# Patient Record
Sex: MALE | Race: BLACK OR AFRICAN AMERICAN | NOT HISPANIC OR LATINO | Employment: FULL TIME | URBAN - METROPOLITAN AREA
[De-identification: names, ages, dates, MRNs, and addresses within clinical notes are randomized per-mention and may not be internally consistent; named-entity substitution may affect disease eponyms.]

---

## 2018-05-17 ENCOUNTER — APPOINTMENT (OUTPATIENT)
Dept: GENERAL RADIOLOGY | Facility: HOSPITAL | Age: 53
End: 2018-05-17

## 2018-05-17 ENCOUNTER — HOSPITAL ENCOUNTER (EMERGENCY)
Facility: HOSPITAL | Age: 53
Discharge: HOME | End: 2018-05-17
Attending: EMERGENCY MEDICINE | Admitting: EMERGENCY MEDICINE

## 2018-05-17 VITALS
TEMPERATURE: 97.8 F | HEART RATE: 76 BPM | DIASTOLIC BLOOD PRESSURE: 108 MMHG | RESPIRATION RATE: 18 BRPM | HEIGHT: 72 IN | WEIGHT: 290 LBS | SYSTOLIC BLOOD PRESSURE: 201 MMHG | OXYGEN SATURATION: 96 % | BODY MASS INDEX: 39.28 KG/M2

## 2018-05-17 DIAGNOSIS — R60.9 EDEMA, UNSPECIFIED TYPE: Primary | ICD-10-CM

## 2018-05-17 DIAGNOSIS — R60.0 HAND EDEMA: ICD-10-CM

## 2018-05-17 DIAGNOSIS — R60.0 LEG EDEMA: ICD-10-CM

## 2018-05-17 DIAGNOSIS — I10 HYPERTENSION, UNSPECIFIED TYPE: ICD-10-CM

## 2018-05-17 LAB
ALBUMIN SERPL-MCNC: 4.3 G/DL (ref 3.2–4.8)
ALBUMIN/GLOB SERPL: 1 G/DL (ref 1.5–2.5)
ALP SERPL-CCNC: 98 U/L (ref 25–100)
ALT SERPL W P-5'-P-CCNC: 25 U/L (ref 7–40)
ANION GAP SERPL CALCULATED.3IONS-SCNC: 10 MMOL/L (ref 3–11)
AST SERPL-CCNC: 19 U/L (ref 0–33)
BACTERIA UR QL AUTO: ABNORMAL /HPF
BASOPHILS # BLD AUTO: 0.02 10*3/MM3 (ref 0–0.2)
BASOPHILS NFR BLD AUTO: 0.2 % (ref 0–1)
BILIRUB SERPL-MCNC: 0.8 MG/DL (ref 0.3–1.2)
BILIRUB UR QL STRIP: ABNORMAL
BNP SERPL-MCNC: 87 PG/ML (ref 0–100)
BUN BLD-MCNC: 17 MG/DL (ref 9–23)
BUN/CREAT SERPL: 15.5 (ref 7–25)
CALCIUM SPEC-SCNC: 9.6 MG/DL (ref 8.7–10.4)
CHLORIDE SERPL-SCNC: 101 MMOL/L (ref 99–109)
CK SERPL-CCNC: 121 U/L (ref 26–174)
CLARITY UR: ABNORMAL
CO2 SERPL-SCNC: 28 MMOL/L (ref 20–31)
COLOR UR: ABNORMAL
CREAT BLD-MCNC: 1.1 MG/DL (ref 0.6–1.3)
DEPRECATED RDW RBC AUTO: 41.4 FL (ref 37–54)
EOSINOPHIL # BLD AUTO: 0.18 10*3/MM3 (ref 0–0.3)
EOSINOPHIL NFR BLD AUTO: 1.6 % (ref 0–3)
ERYTHROCYTE [DISTWIDTH] IN BLOOD BY AUTOMATED COUNT: 16.2 % (ref 11.3–14.5)
GFR SERPL CREATININE-BSD FRML MDRD: 85 ML/MIN/1.73
GLOBULIN UR ELPH-MCNC: 4.1 GM/DL
GLUCOSE BLD-MCNC: 93 MG/DL (ref 70–100)
GLUCOSE UR STRIP-MCNC: NEGATIVE MG/DL
HCT VFR BLD AUTO: 42.8 % (ref 38.9–50.9)
HGB BLD-MCNC: 13.9 G/DL (ref 13.1–17.5)
HGB UR QL STRIP.AUTO: NEGATIVE
IMM GRANULOCYTES # BLD: 0.02 10*3/MM3 (ref 0–0.03)
IMM GRANULOCYTES NFR BLD: 0.2 % (ref 0–0.6)
KETONES UR QL STRIP: ABNORMAL
LEUKOCYTE ESTERASE UR QL STRIP.AUTO: ABNORMAL
LYMPHOCYTES # BLD AUTO: 2.11 10*3/MM3 (ref 0.6–4.8)
LYMPHOCYTES NFR BLD AUTO: 18.4 % (ref 24–44)
MCH RBC QN AUTO: 23.1 PG (ref 27–31)
MCHC RBC AUTO-ENTMCNC: 32.5 G/DL (ref 32–36)
MCV RBC AUTO: 71 FL (ref 80–99)
MONOCYTES # BLD AUTO: 0.74 10*3/MM3 (ref 0–1)
MONOCYTES NFR BLD AUTO: 6.5 % (ref 0–12)
NEUTROPHILS # BLD AUTO: 8.41 10*3/MM3 (ref 1.5–8.3)
NEUTROPHILS NFR BLD AUTO: 73.3 % (ref 41–71)
NITRITE UR QL STRIP: NEGATIVE
PH UR STRIP.AUTO: 5.5 [PH] (ref 5–8)
PLAT MORPH BLD: NORMAL
PLATELET # BLD AUTO: 212 10*3/MM3 (ref 150–450)
POTASSIUM BLD-SCNC: 3.9 MMOL/L (ref 3.5–5.5)
PROT SERPL-MCNC: 8.4 G/DL (ref 5.7–8.2)
PROT UR QL STRIP: ABNORMAL
RBC # BLD AUTO: 6.03 10*6/MM3 (ref 4.2–5.76)
RBC # UR: ABNORMAL /HPF
RBC MORPH BLD: NORMAL
REF LAB TEST METHOD: ABNORMAL
SODIUM BLD-SCNC: 139 MMOL/L (ref 132–146)
SP GR UR STRIP: 1.03 (ref 1–1.03)
SQUAMOUS #/AREA URNS HPF: ABNORMAL /HPF
UROBILINOGEN UR QL STRIP: ABNORMAL
WBC MORPH BLD: NORMAL
WBC NRBC COR # BLD: 11.46 10*3/MM3 (ref 3.5–10.8)
WBC UR QL AUTO: ABNORMAL /HPF

## 2018-05-17 PROCEDURE — 81001 URINALYSIS AUTO W/SCOPE: CPT | Performed by: NURSE PRACTITIONER

## 2018-05-17 PROCEDURE — 99283 EMERGENCY DEPT VISIT LOW MDM: CPT

## 2018-05-17 PROCEDURE — 83880 ASSAY OF NATRIURETIC PEPTIDE: CPT | Performed by: NURSE PRACTITIONER

## 2018-05-17 PROCEDURE — 71046 X-RAY EXAM CHEST 2 VIEWS: CPT

## 2018-05-17 PROCEDURE — 82550 ASSAY OF CK (CPK): CPT | Performed by: NURSE PRACTITIONER

## 2018-05-17 PROCEDURE — 85025 COMPLETE CBC W/AUTO DIFF WBC: CPT | Performed by: NURSE PRACTITIONER

## 2018-05-17 PROCEDURE — 80053 COMPREHEN METABOLIC PANEL: CPT | Performed by: NURSE PRACTITIONER

## 2018-05-17 PROCEDURE — 85007 BL SMEAR W/DIFF WBC COUNT: CPT | Performed by: NURSE PRACTITIONER

## 2018-05-17 RX ORDER — LISINOPRIL 10 MG/1
20 TABLET ORAL ONCE
Status: COMPLETED | OUTPATIENT
Start: 2018-05-17 | End: 2018-05-17

## 2018-05-17 RX ORDER — FUROSEMIDE 40 MG/1
40 TABLET ORAL ONCE
Status: COMPLETED | OUTPATIENT
Start: 2018-05-17 | End: 2018-05-17

## 2018-05-17 RX ORDER — FUROSEMIDE 20 MG/1
20 TABLET ORAL DAILY
Qty: 7 TABLET | Refills: 0 | Status: SHIPPED | OUTPATIENT
Start: 2018-05-17 | End: 2018-06-24

## 2018-05-17 RX ORDER — LISINOPRIL AND HYDROCHLOROTHIAZIDE 20; 12.5 MG/1; MG/1
1 TABLET ORAL DAILY
Qty: 30 TABLET | Refills: 1 | Status: SHIPPED | OUTPATIENT
Start: 2018-05-17 | End: 2018-09-17

## 2018-05-17 RX ORDER — SODIUM CHLORIDE 0.9 % (FLUSH) 0.9 %
10 SYRINGE (ML) INJECTION AS NEEDED
Status: DISCONTINUED | OUTPATIENT
Start: 2018-05-17 | End: 2018-05-17 | Stop reason: HOSPADM

## 2018-05-17 RX ORDER — SPIRONOLACTONE 25 MG/1
25 TABLET ORAL DAILY
COMMUNITY
End: 2018-05-17 | Stop reason: ALTCHOICE

## 2018-05-17 RX ADMIN — LISINOPRIL 20 MG: 10 TABLET ORAL at 19:31

## 2018-05-17 RX ADMIN — FUROSEMIDE 40 MG: 40 TABLET ORAL at 19:31

## 2018-05-17 NOTE — ED PROVIDER NOTES
Subjective   Freeman Jean is a 52 y.o.male who presents to the emergency department with complaints of joint pain and swelling, worse in the neck, hands, and lower extremities, that has persisted since onset two weeks ago. He was seen at [facility] last week when the pain and swelling had not improved and had an EKG, chest x-ray, and CT of the head with an apparently normal workup. He was given medication in the emergency department but thinks that it was just for his blood pressure. He does have a history of hypertension but has never been diagnosed with diabetes mellitus or congestive heart failure. He has been taking a diuretic without any relief. He has no urinary symptoms, difficulty breathing, or any other acute complaints.         History provided by:  Patient  Illness   Location:  Neck, hands, and lower extremities  Quality:  Pain and swelling  Severity:  Moderate  Onset quality:  Unable to specify  Duration:  2 weeks  Timing:  Constant  Progression:  Unchanged  Chronicity:  New  Context:  The patient has had pain and swelling in his neck, hands, and lower extremities for two weeks. He had a normal workup one week ago at [facility].   Relieved by:  Nothing  Worsened by:  Nothing  Ineffective treatments:  Fluid pill  Associated symptoms: no shortness of breath        Review of Systems   Respiratory: Negative for shortness of breath.    Genitourinary: Negative for difficulty urinating, dysuria, frequency, hematuria and urgency.   Musculoskeletal: Positive for arthralgias, joint swelling and neck pain.   All other systems reviewed and are negative.      Past Medical History:   Diagnosis Date   • Hypertension        No Known Allergies    History reviewed. No pertinent surgical history.    History reviewed. No pertinent family history.    Social History     Social History   • Marital status: Single     Social History Main Topics   • Smoking status: Never Smoker   • Alcohol use Yes      Comment: RARE   • Drug  use: No   • Sexual activity: Defer     Other Topics Concern   • Not on file         Objective   Physical Exam   Constitutional: He is oriented to person, place, and time. He appears well-developed and well-nourished. No distress.   HENT:   Head: Normocephalic and atraumatic.   Nose: Nose normal.   Airway patent.   Eyes: Conjunctivae are normal. No scleral icterus.   Neck: Normal range of motion and phonation normal. Neck supple.   Pulmonary/Chest: Effort normal. No respiratory distress.   Musculoskeletal: He exhibits edema.   3+ pitting edema in the lower extremities and both hands.    Neurological: He is alert and oriented to person, place, and time.   Skin: Skin is warm and dry.   Psychiatric: He has a normal mood and affect. His behavior is normal.   Nursing note and vitals reviewed.      Procedures         ED Course  ED Course as of May 17 1925   Thu May 17, 2018   1917 No pcp. Will changes pt bp meds. Well aware of the ss of worsen. All thankful and agreeable. Will give pcp fu.  [JM]      ED Course User Index  [YONATAN] JOSE ARMANDO Dumas     Recent Results (from the past 24 hour(s))   Comprehensive Metabolic Panel    Collection Time: 05/17/18  5:09 PM   Result Value Ref Range    Glucose 93 70 - 100 mg/dL    BUN 17 9 - 23 mg/dL    Creatinine 1.10 0.60 - 1.30 mg/dL    Sodium 139 132 - 146 mmol/L    Potassium 3.9 3.5 - 5.5 mmol/L    Chloride 101 99 - 109 mmol/L    CO2 28.0 20.0 - 31.0 mmol/L    Calcium 9.6 8.7 - 10.4 mg/dL    Total Protein 8.4 (H) 5.7 - 8.2 g/dL    Albumin 4.30 3.20 - 4.80 g/dL    ALT (SGPT) 25 7 - 40 U/L    AST (SGOT) 19 0 - 33 U/L    Alkaline Phosphatase 98 25 - 100 U/L    Total Bilirubin 0.8 0.3 - 1.2 mg/dL    eGFR  African Amer 85 >60 mL/min/1.73    Globulin 4.1 gm/dL    A/G Ratio 1.0 (L) 1.5 - 2.5 g/dL    BUN/Creatinine Ratio 15.5 7.0 - 25.0    Anion Gap 10.0 3.0 - 11.0 mmol/L   BNP    Collection Time: 05/17/18  5:09 PM   Result Value Ref Range    BNP 87.0 0.0 - 100.0 pg/mL   CK    Collection  Time: 05/17/18  5:09 PM   Result Value Ref Range    Creatine Kinase 121 26 - 174 U/L   CBC Auto Differential    Collection Time: 05/17/18  5:09 PM   Result Value Ref Range    WBC 11.46 (H) 3.50 - 10.80 10*3/mm3    RBC 6.03 (H) 4.20 - 5.76 10*6/mm3    Hemoglobin 13.9 13.1 - 17.5 g/dL    Hematocrit 42.8 38.9 - 50.9 %    MCV 71.0 (L) 80.0 - 99.0 fL    MCH 23.1 (L) 27.0 - 31.0 pg    MCHC 32.5 32.0 - 36.0 g/dL    RDW 16.2 (H) 11.3 - 14.5 %    RDW-SD 41.4 37.0 - 54.0 fl    Platelets 212 150 - 450 10*3/mm3    Neutrophil % 73.3 (H) 41.0 - 71.0 %    Lymphocyte % 18.4 (L) 24.0 - 44.0 %    Monocyte % 6.5 0.0 - 12.0 %    Eosinophil % 1.6 0.0 - 3.0 %    Basophil % 0.2 0.0 - 1.0 %    Immature Grans % 0.2 0.0 - 0.6 %    Neutrophils, Absolute 8.41 (H) 1.50 - 8.30 10*3/mm3    Lymphocytes, Absolute 2.11 0.60 - 4.80 10*3/mm3    Monocytes, Absolute 0.74 0.00 - 1.00 10*3/mm3    Eosinophils, Absolute 0.18 0.00 - 0.30 10*3/mm3    Basophils, Absolute 0.02 0.00 - 0.20 10*3/mm3    Immature Grans, Absolute 0.02 0.00 - 0.03 10*3/mm3   Scan Slide    Collection Time: 05/17/18  5:09 PM   Result Value Ref Range    RBC Morphology Normal Normal    WBC Morphology Normal Normal    Platelet Morphology Normal Normal   Urinalysis With / Culture If Indicated - Urine, Clean Catch    Collection Time: 05/17/18  5:40 PM   Result Value Ref Range    Color, UA Dark Yellow (A) Yellow, Straw    Appearance, UA Cloudy (A) Clear    pH, UA 5.5 5.0 - 8.0    Specific Gravity, UA 1.034 (H) 1.001 - 1.030    Glucose, UA Negative Negative    Ketones, UA Trace (A) Negative    Bilirubin, UA Small (1+) (A) Negative    Blood, UA Negative Negative    Protein, UA 30 mg/dL (1+) (A) Negative    Leuk Esterase, UA Trace (A) Negative    Nitrite, UA Negative Negative    Urobilinogen, UA 1.0 E.U./dL 0.2 - 1.0 E.U./dL   Urinalysis, Microscopic Only - Urine, Clean Catch    Collection Time: 05/17/18  5:40 PM   Result Value Ref Range    RBC, UA 0-2 None Seen, 0-2 /HPF    WBC, UA 3-5 (A)  "None Seen, 0-2 /HPF    Bacteria, UA None Seen None Seen, Trace /HPF    Squamous Epithelial Cells, UA 3-6 (A) None Seen, 0-2 /HPF    Methodology Manual Light Microscopy      Note: In addition to lab results from this visit, the labs listed above may include labs taken at another facility or during a different encounter within the last 24 hours. Please correlate lab times with ED admission and discharge times for further clarification of the services performed during this visit.    XR Chest 2 View   Preliminary Result   Increase in central bony vascularity with interstitial   prominence concerning for interstitial edema however no focal   consolidation or significant pleural effusion.                Vitals:    05/17/18 1422   BP: (!) 193/92   BP Location: Left arm   Patient Position: Sitting   Pulse: 76   Resp: 18   Temp: 97.8 °F (36.6 °C)   TempSrc: Oral   SpO2: 96%   Weight: 132 kg (290 lb)   Height: 182.9 cm (72\")     Medications   sodium chloride 0.9 % flush 10 mL (not administered)   furosemide (LASIX) tablet 40 mg (not administered)   lisinopril (PRINIVIL,ZESTRIL) tablet 20 mg (not administered)     ECG/EMG Results (last 24 hours)     ** No results found for the last 24 hours. **                       [facility]    Final diagnoses:   Edema, unspecified type   Hypertension, unspecified type   Leg edema   Hand edema       Documentation assistance provided by aquilino Quesada.  Information recorded by the scribe was done at my direction and has been verified and validated by me.     Ana Quesada  05/17/18 3970       JOSE ARMANDO Dumas  05/17/18 1925    "

## 2018-05-17 NOTE — DISCHARGE INSTRUCTIONS
Follow up with one of the [facility] physician groups below to setup primary care. If you have trouble following up, please call Claudia Gaitan, our transitional care nurse, at [phone].    (Dr. Aguero, Dr. Rutledge, Dr. Scott, and Dr. Ragsdale.)  [facility], Primary Care, [phone], [address], [place] [address]    [facility], Primary Care, [phone], [address], [address] [place], [address] [facility]) [facility], Primary Care, [phone], [address], [address] [place], [address] [facility], Primary Care, [phone], [address], [address] [place], [address]     [place] [facility], Primary Care, [phone], [address], [address] [place], [address]    [place] 2 [facility], Primary Care, [phone], [address]    [place], [address] [facility], Primary Care, [phone], [address] [place], [address] [facility], Primary Care, [phone], [address], [address] [place], [address] [facility]) [facility], Primary Care, [phone], [address], [address] [place], [address] [facility] Medical [facility], Primary Care, [phone], [address], [address] [place], [address] [facility], Primary Care, [phone], [address], [place], [address]     [facility], Primary Care, [phone], [address] [address]    Follow up with one of the physician centers below to setup primary care.    [facility], [phone],  [address], [address], [place], [facility]    [facility] Dept[facility], [phone], [address], [address] [place] [address], [phone], [address] [place], [facility];     [facility], [phone], [address] [place] [address]

## 2018-06-24 ENCOUNTER — HOSPITAL ENCOUNTER (EMERGENCY)
Facility: HOSPITAL | Age: 53
Discharge: HOME | End: 2018-06-24
Attending: EMERGENCY MEDICINE | Admitting: EMERGENCY MEDICINE

## 2018-06-24 VITALS
RESPIRATION RATE: 18 BRPM | SYSTOLIC BLOOD PRESSURE: 184 MMHG | TEMPERATURE: 98.5 F | HEART RATE: 68 BPM | DIASTOLIC BLOOD PRESSURE: 106 MMHG | BODY MASS INDEX: 40.79 KG/M2 | HEIGHT: 72 IN | WEIGHT: 301.15 LBS | OXYGEN SATURATION: 96 %

## 2018-06-24 DIAGNOSIS — M65.9 SYNOVITIS OF MULTIPLE SITES: ICD-10-CM

## 2018-06-24 DIAGNOSIS — I10 ELEVATED BLOOD PRESSURE READING WITH DIAGNOSIS OF HYPERTENSION: ICD-10-CM

## 2018-06-24 DIAGNOSIS — Z82.61 FAMILY HISTORY OF RHEUMATOID ARTHRITIS: ICD-10-CM

## 2018-06-24 DIAGNOSIS — M13.0 POLYARTHRITIS OF MULTIPLE SITES: Primary | ICD-10-CM

## 2018-06-24 LAB
ANION GAP SERPL CALCULATED.3IONS-SCNC: 9 MMOL/L (ref 3–11)
BASOPHILS # BLD AUTO: 0.02 10*3/MM3 (ref 0–0.2)
BASOPHILS NFR BLD AUTO: 0.2 % (ref 0–1)
BUN BLD-MCNC: 16 MG/DL (ref 9–23)
BUN/CREAT SERPL: 21.3 (ref 7–25)
CALCIUM SPEC-SCNC: 9.2 MG/DL (ref 8.7–10.4)
CHLORIDE SERPL-SCNC: 105 MMOL/L (ref 99–109)
CO2 SERPL-SCNC: 28 MMOL/L (ref 20–31)
CREAT BLD-MCNC: 0.75 MG/DL (ref 0.6–1.3)
CRP SERPL-MCNC: 1.56 MG/DL (ref 0–1)
DEPRECATED RDW RBC AUTO: 42.3 FL (ref 37–54)
EOSINOPHIL # BLD AUTO: 0.33 10*3/MM3 (ref 0–0.3)
EOSINOPHIL NFR BLD AUTO: 3.9 % (ref 0–3)
ERYTHROCYTE [DISTWIDTH] IN BLOOD BY AUTOMATED COUNT: 16.9 % (ref 11.3–14.5)
ERYTHROCYTE [SEDIMENTATION RATE] IN BLOOD: 65 MM/HR (ref 0–20)
GFR SERPL CREATININE-BSD FRML MDRD: 132 ML/MIN/1.73
GLUCOSE BLD-MCNC: 100 MG/DL (ref 70–100)
HCT VFR BLD AUTO: 39.7 % (ref 38.9–50.9)
HGB BLD-MCNC: 12.7 G/DL (ref 13.1–17.5)
IMM GRANULOCYTES # BLD: 0.01 10*3/MM3 (ref 0–0.03)
IMM GRANULOCYTES NFR BLD: 0.1 % (ref 0–0.6)
LYMPHOCYTES # BLD AUTO: 1.64 10*3/MM3 (ref 0.6–4.8)
LYMPHOCYTES NFR BLD AUTO: 19.5 % (ref 24–44)
MCH RBC QN AUTO: 22.6 PG (ref 27–31)
MCHC RBC AUTO-ENTMCNC: 32 G/DL (ref 32–36)
MCV RBC AUTO: 70.5 FL (ref 80–99)
MONOCYTES # BLD AUTO: 0.4 10*3/MM3 (ref 0–1)
MONOCYTES NFR BLD AUTO: 4.8 % (ref 0–12)
NEUTROPHILS # BLD AUTO: 6.03 10*3/MM3 (ref 1.5–8.3)
NEUTROPHILS NFR BLD AUTO: 71.6 % (ref 41–71)
PLAT MORPH BLD: NORMAL
PLATELET # BLD AUTO: 153 10*3/MM3 (ref 150–450)
PMV BLD AUTO: ABNORMAL FL (ref 6–12)
POTASSIUM BLD-SCNC: 3.6 MMOL/L (ref 3.5–5.5)
RBC # BLD AUTO: 5.63 10*6/MM3 (ref 4.2–5.76)
RBC MORPH BLD: NORMAL
SODIUM BLD-SCNC: 142 MMOL/L (ref 132–146)
WBC MORPH BLD: NORMAL
WBC NRBC COR # BLD: 8.42 10*3/MM3 (ref 3.5–10.8)

## 2018-06-24 PROCEDURE — 85025 COMPLETE CBC W/AUTO DIFF WBC: CPT | Performed by: EMERGENCY MEDICINE

## 2018-06-24 PROCEDURE — 80048 BASIC METABOLIC PNL TOTAL CA: CPT | Performed by: EMERGENCY MEDICINE

## 2018-06-24 PROCEDURE — 25010000002 METHYLPREDNISOLONE PER 125 MG: Performed by: EMERGENCY MEDICINE

## 2018-06-24 PROCEDURE — 85652 RBC SED RATE AUTOMATED: CPT | Performed by: EMERGENCY MEDICINE

## 2018-06-24 PROCEDURE — 25010000002 KETOROLAC TROMETHAMINE PER 15 MG: Performed by: EMERGENCY MEDICINE

## 2018-06-24 PROCEDURE — 99283 EMERGENCY DEPT VISIT LOW MDM: CPT

## 2018-06-24 PROCEDURE — 96375 TX/PRO/DX INJ NEW DRUG ADDON: CPT

## 2018-06-24 PROCEDURE — 86140 C-REACTIVE PROTEIN: CPT | Performed by: EMERGENCY MEDICINE

## 2018-06-24 PROCEDURE — 96374 THER/PROPH/DIAG INJ IV PUSH: CPT

## 2018-06-24 PROCEDURE — 85007 BL SMEAR W/DIFF WBC COUNT: CPT | Performed by: EMERGENCY MEDICINE

## 2018-06-24 RX ORDER — METHYLPREDNISOLONE SODIUM SUCCINATE 125 MG/2ML
125 INJECTION, POWDER, LYOPHILIZED, FOR SOLUTION INTRAMUSCULAR; INTRAVENOUS ONCE
Status: COMPLETED | OUTPATIENT
Start: 2018-06-24 | End: 2018-06-24

## 2018-06-24 RX ORDER — SODIUM CHLORIDE 0.9 % (FLUSH) 0.9 %
10 SYRINGE (ML) INJECTION AS NEEDED
Status: DISCONTINUED | OUTPATIENT
Start: 2018-06-24 | End: 2018-06-24 | Stop reason: HOSPADM

## 2018-06-24 RX ORDER — KETOROLAC TROMETHAMINE 15 MG/ML
10 INJECTION, SOLUTION INTRAMUSCULAR; INTRAVENOUS ONCE
Status: COMPLETED | OUTPATIENT
Start: 2018-06-24 | End: 2018-06-24

## 2018-06-24 RX ORDER — SULFASALAZINE 500 MG/1
500 TABLET ORAL 2 TIMES DAILY
Qty: 60 TABLET | Refills: 0 | Status: SHIPPED | OUTPATIENT
Start: 2018-06-24 | End: 2018-09-17

## 2018-06-24 RX ORDER — PREDNISONE 10 MG/1
TABLET ORAL
Qty: 35 TABLET | Refills: 0 | Status: SHIPPED | OUTPATIENT
Start: 2018-06-24 | End: 2018-09-17

## 2018-06-24 RX ADMIN — KETOROLAC TROMETHAMINE 10 MG: 15 INJECTION, SOLUTION INTRAMUSCULAR; INTRAVENOUS at 13:17

## 2018-06-24 RX ADMIN — METHYLPREDNISOLONE SODIUM SUCCINATE 125 MG: 125 INJECTION, POWDER, FOR SOLUTION INTRAMUSCULAR; INTRAVENOUS at 13:17

## 2018-09-17 ENCOUNTER — HOSPITAL ENCOUNTER (EMERGENCY)
Facility: HOSPITAL | Age: 53
Discharge: HOME | End: 2018-09-17
Attending: EMERGENCY MEDICINE | Admitting: EMERGENCY MEDICINE

## 2018-09-17 VITALS
DIASTOLIC BLOOD PRESSURE: 70 MMHG | TEMPERATURE: 99.2 F | SYSTOLIC BLOOD PRESSURE: 160 MMHG | WEIGHT: 300 LBS | BODY MASS INDEX: 40.63 KG/M2 | OXYGEN SATURATION: 98 % | HEART RATE: 80 BPM | HEIGHT: 72 IN | RESPIRATION RATE: 20 BRPM

## 2018-09-17 DIAGNOSIS — M13.0 POLYARTHRITIS: Primary | ICD-10-CM

## 2018-09-17 DIAGNOSIS — I10 HYPERTENSION, UNSPECIFIED TYPE: ICD-10-CM

## 2018-09-17 LAB
ALBUMIN SERPL-MCNC: 3.98 G/DL (ref 3.2–4.8)
ALBUMIN/GLOB SERPL: 1.2 G/DL (ref 1.5–2.5)
ALP SERPL-CCNC: 66 U/L (ref 25–100)
ALT SERPL W P-5'-P-CCNC: 13 U/L (ref 7–40)
ANION GAP SERPL CALCULATED.3IONS-SCNC: 8 MMOL/L (ref 3–11)
AST SERPL-CCNC: 16 U/L (ref 0–33)
BASOPHILS # BLD AUTO: 0.02 10*3/MM3 (ref 0–0.2)
BASOPHILS NFR BLD AUTO: 0.2 % (ref 0–1)
BILIRUB SERPL-MCNC: 0.5 MG/DL (ref 0.3–1.2)
BUN BLD-MCNC: 19 MG/DL (ref 9–23)
BUN/CREAT SERPL: 20.7 (ref 7–25)
CALCIUM SPEC-SCNC: 8.9 MG/DL (ref 8.7–10.4)
CHLORIDE SERPL-SCNC: 104 MMOL/L (ref 99–109)
CO2 SERPL-SCNC: 27 MMOL/L (ref 20–31)
CREAT BLD-MCNC: 0.92 MG/DL (ref 0.6–1.3)
CRP SERPL-MCNC: 1.34 MG/DL (ref 0–1)
DEPRECATED RDW RBC AUTO: 41.5 FL (ref 37–54)
EOSINOPHIL # BLD AUTO: 0.22 10*3/MM3 (ref 0–0.3)
EOSINOPHIL NFR BLD AUTO: 2.4 % (ref 0–3)
ERYTHROCYTE [DISTWIDTH] IN BLOOD BY AUTOMATED COUNT: 15.6 % (ref 11.3–14.5)
ERYTHROCYTE [SEDIMENTATION RATE] IN BLOOD: 64 MM/HR (ref 0–20)
GFR SERPL CREATININE-BSD FRML MDRD: 104 ML/MIN/1.73
GLOBULIN UR ELPH-MCNC: 3.2 GM/DL
GLUCOSE BLD-MCNC: 82 MG/DL (ref 70–100)
HCT VFR BLD AUTO: 39.4 % (ref 38.9–50.9)
HGB BLD-MCNC: 12.5 G/DL (ref 13.1–17.5)
HYPOCHROMIA BLD QL: NORMAL
IMM GRANULOCYTES # BLD: 0.02 10*3/MM3 (ref 0–0.03)
IMM GRANULOCYTES NFR BLD: 0.2 % (ref 0–0.6)
LYMPHOCYTES # BLD AUTO: 1.67 10*3/MM3 (ref 0.6–4.8)
LYMPHOCYTES NFR BLD AUTO: 18.4 % (ref 24–44)
MCH RBC QN AUTO: 23.4 PG (ref 27–31)
MCHC RBC AUTO-ENTMCNC: 31.7 G/DL (ref 32–36)
MCV RBC AUTO: 73.6 FL (ref 80–99)
MONOCYTES # BLD AUTO: 0.72 10*3/MM3 (ref 0–1)
MONOCYTES NFR BLD AUTO: 7.9 % (ref 0–12)
NEUTROPHILS # BLD AUTO: 6.46 10*3/MM3 (ref 1.5–8.3)
NEUTROPHILS NFR BLD AUTO: 71.1 % (ref 41–71)
PLAT MORPH BLD: NORMAL
PLATELET # BLD AUTO: 173 10*3/MM3 (ref 150–450)
PMV BLD AUTO: 11.2 FL (ref 6–12)
POTASSIUM BLD-SCNC: 3.9 MMOL/L (ref 3.5–5.5)
PROT SERPL-MCNC: 7.2 G/DL (ref 5.7–8.2)
RBC # BLD AUTO: 5.35 10*6/MM3 (ref 4.2–5.76)
SODIUM BLD-SCNC: 139 MMOL/L (ref 132–146)
WBC MORPH BLD: NORMAL
WBC NRBC COR # BLD: 9.09 10*3/MM3 (ref 3.5–10.8)

## 2018-09-17 PROCEDURE — 85025 COMPLETE CBC W/AUTO DIFF WBC: CPT | Performed by: EMERGENCY MEDICINE

## 2018-09-17 PROCEDURE — 85652 RBC SED RATE AUTOMATED: CPT | Performed by: EMERGENCY MEDICINE

## 2018-09-17 PROCEDURE — 86140 C-REACTIVE PROTEIN: CPT | Performed by: EMERGENCY MEDICINE

## 2018-09-17 PROCEDURE — 85007 BL SMEAR W/DIFF WBC COUNT: CPT | Performed by: EMERGENCY MEDICINE

## 2018-09-17 PROCEDURE — 63710000001 PREDNISONE PER 1 MG: Performed by: EMERGENCY MEDICINE

## 2018-09-17 PROCEDURE — 80053 COMPREHEN METABOLIC PANEL: CPT | Performed by: EMERGENCY MEDICINE

## 2018-09-17 PROCEDURE — 99283 EMERGENCY DEPT VISIT LOW MDM: CPT

## 2018-09-17 RX ORDER — PREDNISONE 10 MG/1
TABLET ORAL
Qty: 35 TABLET | Refills: 0 | Status: SHIPPED | OUTPATIENT
Start: 2018-09-17

## 2018-09-17 RX ORDER — HYDROCODONE BITARTRATE AND ACETAMINOPHEN 7.5; 325 MG/1; MG/1
1 TABLET ORAL ONCE
Status: COMPLETED | OUTPATIENT
Start: 2018-09-17 | End: 2018-09-17

## 2018-09-17 RX ORDER — PREDNISONE 20 MG/1
60 TABLET ORAL ONCE
Status: COMPLETED | OUTPATIENT
Start: 2018-09-17 | End: 2018-09-17

## 2018-09-17 RX ORDER — LISINOPRIL AND HYDROCHLOROTHIAZIDE 20; 12.5 MG/1; MG/1
1 TABLET ORAL DAILY
Qty: 30 TABLET | Refills: 0 | Status: SHIPPED | OUTPATIENT
Start: 2018-09-17 | End: 2018-10-17

## 2018-09-17 RX ORDER — SULFASALAZINE 500 MG/1
500 TABLET ORAL 2 TIMES DAILY
Qty: 60 TABLET | Refills: 0 | OUTPATIENT
Start: 2018-09-17 | End: 2021-05-25

## 2018-09-17 RX ADMIN — PREDNISONE 60 MG: 20 TABLET ORAL at 15:38

## 2018-09-17 RX ADMIN — HYDROCODONE BITARTRATE AND ACETAMINOPHEN 1 TABLET: 7.5; 325 TABLET ORAL at 15:39

## 2018-09-17 NOTE — ED PROVIDER NOTES
"Subjective   Pt is a 54 yo male presenting to ED with joint pain and swelling. He complains of diffuse swelling and generalized pain \"to all my joints\". He explains this is a recurrent flare of previous joint pain and this episode has been going on for about a month. He was last seen in ED in June and given course of Prednisone and Sulfasalzine which significant helped all his joint pain. He states he has not f/u with Rheumatologist or PCP / established care. He states he tried contacting the Rheumatologist but they told him he needed a referral. He denies fever, chills, redness to joint, dizziness, CP, SOB, abd pain or rashes. He has hx of high BP but hasn't been taking Lisinopril for several months. Pt notes multiple family members with Rheumatoid Arthritis.         History provided by:  Patient      Review of Systems   Constitutional: Negative for chills and fever.   HENT: Negative for congestion, ear pain, sore throat and trouble swallowing.    Eyes: Negative for pain, redness and visual disturbance.   Respiratory: Negative for cough and shortness of breath.    Cardiovascular: Negative for chest pain and leg swelling.   Gastrointestinal: Negative for abdominal pain, blood in stool, constipation, diarrhea, nausea and vomiting.   Genitourinary: Negative for difficulty urinating, dysuria and flank pain.   Musculoskeletal: Positive for arthralgias (Diffuse, all joints) and joint swelling. Negative for back pain and neck pain.   Skin: Negative.  Negative for rash and wound.   Allergic/Immunologic: Negative.    Neurological: Negative for dizziness, syncope, weakness, numbness and headaches.   Psychiatric/Behavioral: Negative for confusion.   All other systems reviewed and are negative.      Past Medical History:   Diagnosis Date   • Arthritis    • Hypertension        No Known Allergies    No past surgical history on file.    No family history on file.    Social History     Social History   • Marital status: Single "     Social History Main Topics   • Smoking status: Never Smoker   • Alcohol use Yes      Comment: RARE   • Drug use: No   • Sexual activity: Defer     Other Topics Concern   • Not on file           Objective   Physical Exam   Constitutional: He is oriented to person, place, and time. He appears well-developed and well-nourished.   HENT:   Head: Atraumatic.   Nose: Nose normal.   Eyes: Pupils are equal, round, and reactive to light. Conjunctivae, EOM and lids are normal.   Neck: Normal range of motion. Neck supple.   Cardiovascular: Normal rate, regular rhythm and normal heart sounds.    Pulmonary/Chest: Effort normal and breath sounds normal.   Abdominal: Soft. He exhibits no distension. There is no tenderness. There is no rebound and no guarding.   Musculoskeletal: Normal range of motion. He exhibits edema and tenderness. He exhibits no deformity.   Patient has overall diffuse swelling of digits, hands, feet / ankles, knees with some decreased range of motion. Pain with range of motion.  No cellulitis, erythema, or signs of infection with no gross bony deformities. Neurovascularly intact.    Neurological: He is alert and oriented to person, place, and time. He has normal strength. No sensory deficit.   Skin: Skin is warm, dry and intact. No ecchymosis, no lesion and no rash noted.   Psychiatric: He has a normal mood and affect. His behavior is normal.   Nursing note and vitals reviewed.      Procedures           ED Course      Reviewed old records. Discussed results and tx plan with patient. Will refill Rx for Lisinopril and place on course of Prednisone taper and Sulfasalazine. Explained importance of establishing a PCP ASAP. List provided. Explained he needs referral from PCP for Rheumatologist.       Recent Results (from the past 24 hour(s))   Comprehensive Metabolic Panel    Collection Time: 09/17/18  3:56 PM   Result Value Ref Range    Glucose 82 70 - 100 mg/dL    BUN 19 9 - 23 mg/dL    Creatinine 0.92 0.60 -  1.30 mg/dL    Sodium 139 132 - 146 mmol/L    Potassium 3.9 3.5 - 5.5 mmol/L    Chloride 104 99 - 109 mmol/L    CO2 27.0 20.0 - 31.0 mmol/L    Calcium 8.9 8.7 - 10.4 mg/dL    Total Protein 7.2 5.7 - 8.2 g/dL    Albumin 3.98 3.20 - 4.80 g/dL    ALT (SGPT) 13 7 - 40 U/L    AST (SGOT) 16 0 - 33 U/L    Alkaline Phosphatase 66 25 - 100 U/L    Total Bilirubin 0.5 0.3 - 1.2 mg/dL    eGFR  African Amer 104 >60 mL/min/1.73    Globulin 3.2 gm/dL    A/G Ratio 1.2 (L) 1.5 - 2.5 g/dL    BUN/Creatinine Ratio 20.7 7.0 - 25.0    Anion Gap 8.0 3.0 - 11.0 mmol/L   Sedimentation Rate    Collection Time: 09/17/18  3:56 PM   Result Value Ref Range    Sed Rate 64 (H) 0 - 20 mm/hr   C-reactive Protein    Collection Time: 09/17/18  3:56 PM   Result Value Ref Range    C-Reactive Protein 1.34 (H) 0.00 - 1.00 mg/dL   CBC Auto Differential    Collection Time: 09/17/18  3:56 PM   Result Value Ref Range    WBC 9.09 3.50 - 10.80 10*3/mm3    RBC 5.35 4.20 - 5.76 10*6/mm3    Hemoglobin 12.5 (L) 13.1 - 17.5 g/dL    Hematocrit 39.4 38.9 - 50.9 %    MCV 73.6 (L) 80.0 - 99.0 fL    MCH 23.4 (L) 27.0 - 31.0 pg    MCHC 31.7 (L) 32.0 - 36.0 g/dL    RDW 15.6 (H) 11.3 - 14.5 %    RDW-SD 41.5 37.0 - 54.0 fl    MPV 11.2 6.0 - 12.0 fL    Platelets 173 150 - 450 10*3/mm3    Neutrophil % 71.1 (H) 41.0 - 71.0 %    Lymphocyte % 18.4 (L) 24.0 - 44.0 %    Monocyte % 7.9 0.0 - 12.0 %    Eosinophil % 2.4 0.0 - 3.0 %    Basophil % 0.2 0.0 - 1.0 %    Immature Grans % 0.2 0.0 - 0.6 %    Neutrophils, Absolute 6.46 1.50 - 8.30 10*3/mm3    Lymphocytes, Absolute 1.67 0.60 - 4.80 10*3/mm3    Monocytes, Absolute 0.72 0.00 - 1.00 10*3/mm3    Eosinophils, Absolute 0.22 0.00 - 0.30 10*3/mm3    Basophils, Absolute 0.02 0.00 - 0.20 10*3/mm3    Immature Grans, Absolute 0.02 0.00 - 0.03 10*3/mm3   Scan Slide    Collection Time: 09/17/18  3:56 PM   Result Value Ref Range    Hypochromia Slight/1+ None Seen    WBC Morphology Normal Normal    Platelet Morphology Normal Normal     Note: In  "addition to lab results from this visit, the labs listed above may include labs taken at another facility or during a different encounter within the last 24 hours. Please correlate lab times with ED admission and discharge times for further clarification of the services performed during this visit.    No orders to display     Vitals:    09/17/18 1402 09/17/18 1725 09/17/18 1728   BP: (!) 209/109 172/70 160/70   BP Location: Left arm     Patient Position: Sitting     Pulse: 82 90 80   Resp: 20 18 20   Temp: 99.2 °F (37.3 °C)     TempSrc: Oral     SpO2: 98% 99% 98%   Weight: 136 kg (300 lb)     Height: 182.9 cm (72\")       Medications   predniSONE (DELTASONE) tablet 60 mg (60 mg Oral Given 9/17/18 1538)   HYDROcodone-acetaminophen (NORCO) 7.5-325 MG per tablet 1 tablet (1 tablet Oral Given 9/17/18 1539)     ECG/EMG Results (last 24 hours)     ** No results found for the last 24 hours. **                    [facility]      Final diagnoses:   Polyarthritis   Hypertension, unspecified type            Ivette Clarke PA  09/17/18 2224    "

## 2018-09-17 NOTE — DISCHARGE INSTRUCTIONS
Follow up with one of the [facility] physician groups below to setup primary care. If you have trouble following up, please call Claudia Gaitan, our transitional care nurse, at [phone].    (Dr. Aguero, Dr. Rutledge, Dr. Scott, and Dr. Ragsdale.)  [facility], Primary Care, [phone], [address], [place] [address]    [facility], Primary Care, [phone], [address], [address] [place], [address] [facility]) [facility] Medical [facility], Primary Care, [phone], [address] [place], [address] [facility], Primary Care, [phone], [address], [address] [place], [address]     [place] 1 [facility], Primary Care, [phone], [address] [place], [address]    [place] 2 [facility], Primary Care, [phone], [address], [address]    [place], [address] [facility], Primary Care, [phone], [address], [address] [place], [address] [facility], Primary Care, [phone], [address] [place], [address] [facility]) [facility], Primary Care, [phone], [address] [place], [address] [facility], Primary Care, [phone], [address], [address] [place], [address] [facility], Primary Care, [phone], [address], [place], [address]     [facility], Primary Care, [phone], [address] [id number]

## 2021-05-25 ENCOUNTER — HOSPITAL ENCOUNTER (EMERGENCY)
Facility: HOSPITAL | Age: 56
Discharge: HOME | End: 2021-05-25
Attending: EMERGENCY MEDICINE | Admitting: EMERGENCY MEDICINE

## 2021-05-25 VITALS
WEIGHT: 315 LBS | HEIGHT: 72 IN | TEMPERATURE: 97.8 F | OXYGEN SATURATION: 96 % | SYSTOLIC BLOOD PRESSURE: 181 MMHG | RESPIRATION RATE: 20 BRPM | DIASTOLIC BLOOD PRESSURE: 104 MMHG | BODY MASS INDEX: 42.66 KG/M2 | HEART RATE: 81 BPM

## 2021-05-25 DIAGNOSIS — I10 ELEVATED BLOOD PRESSURE READING WITH DIAGNOSIS OF HYPERTENSION: ICD-10-CM

## 2021-05-25 DIAGNOSIS — R04.0 ACUTE ANTERIOR EPISTAXIS: Primary | ICD-10-CM

## 2021-05-25 PROCEDURE — 99283 EMERGENCY DEPT VISIT LOW MDM: CPT

## 2021-05-25 RX ORDER — CLONIDINE HYDROCHLORIDE 0.1 MG/1
0.1 TABLET ORAL ONCE
Status: COMPLETED | OUTPATIENT
Start: 2021-05-25 | End: 2021-05-25

## 2021-05-25 RX ORDER — OXYMETAZOLINE HYDROCHLORIDE 0.05 G/100ML
2 SPRAY NASAL ONCE
Status: COMPLETED | OUTPATIENT
Start: 2021-05-25 | End: 2021-05-25

## 2021-05-25 RX ORDER — FUROSEMIDE 40 MG/1
40 TABLET ORAL 2 TIMES DAILY
Qty: 60 TABLET | Refills: 0 | Status: SHIPPED | OUTPATIENT
Start: 2021-05-25 | End: 2022-09-21 | Stop reason: SDUPTHER

## 2021-05-25 RX ORDER — PHENYTOIN 50 MG/1
50 TABLET, CHEWABLE ORAL 3 TIMES DAILY
COMMUNITY

## 2021-05-25 RX ORDER — OXYMETAZOLINE HYDROCHLORIDE 0.05 G/100ML
2 SPRAY NASAL 2 TIMES DAILY
Qty: 15 ML | Refills: 0 | Status: SHIPPED | OUTPATIENT
Start: 2021-05-25 | End: 2021-05-28

## 2021-05-25 RX ORDER — FLUTICASONE PROPIONATE 50 MCG
2 SPRAY, SUSPENSION (ML) NASAL DAILY
Qty: 16 G | Refills: 0 | Status: SHIPPED | OUTPATIENT
Start: 2021-05-25

## 2021-05-25 RX ORDER — FUROSEMIDE 40 MG/1
40 TABLET ORAL ONCE
Status: COMPLETED | OUTPATIENT
Start: 2021-05-25 | End: 2021-05-25

## 2021-05-25 RX ORDER — FUROSEMIDE 40 MG/1
40 TABLET ORAL 2 TIMES DAILY
COMMUNITY
End: 2021-05-25 | Stop reason: SDUPTHER

## 2021-05-25 RX ORDER — FEXOFENADINE HCL 180 MG/1
180 TABLET ORAL DAILY
Qty: 30 TABLET | Refills: 0 | Status: SHIPPED | OUTPATIENT
Start: 2021-05-25

## 2021-05-25 RX ADMIN — FUROSEMIDE 40 MG: 40 TABLET ORAL at 10:02

## 2021-05-25 RX ADMIN — CLONIDINE HYDROCHLORIDE 0.1 MG: 0.1 TABLET ORAL at 10:02

## 2021-05-25 RX ADMIN — Medication 2 SPRAY: at 09:40

## 2021-05-25 NOTE — DISCHARGE INSTRUCTIONS
Nasal clip and Afrin as needed for occasional nosebleeds.  Follow-up with ENT if they continue.  You can expect to have occasional nosebleeds moving forward until that area of the nose recovers and heals.

## 2021-05-25 NOTE — ED PROVIDER NOTES
Subjective   The patient presents to the emergency department with a 4-day history of intermittent epistaxis from the right side of the nose.  Patient reports that each episode lasts about 5 minutes.  He does not have any history of a bleeding disorder and is not on any anticoagulation.  The patient does have a history of hypertension.  Patient denies any headache or neck pain.  No fever or chills.  No other acute symptoms or complaints reported on arrival.      History provided by:  Patient      Review of Systems   Constitutional: Negative.    HENT: Positive for nosebleeds.         Epistaxis   Respiratory: Negative.    Cardiovascular: Negative.    Skin: Negative.    Neurological: Negative.    Hematological: Does not bruise/bleed easily.   All other systems reviewed and are negative.      Past Medical History:   Diagnosis Date   • Arthritis    • Hypertension        No Known Allergies    History reviewed. No pertinent surgical history.    History reviewed. No pertinent family history.    Social History     Socioeconomic History   • Marital status: Single     Spouse name: Not on file   • Number of children: Not on file   • Years of education: Not on file   • Highest education level: Not on file   Tobacco Use   • Smoking status: Never Smoker   Substance and Sexual Activity   • Alcohol use: Yes     Comment: RARE   • Drug use: No   • Sexual activity: Defer           Objective   Physical Exam  Vitals and nursing note reviewed.   Constitutional:       Appearance: Normal appearance. He is obese.   HENT:      Head: Normocephalic and atraumatic.      Right Ear: External ear normal.      Left Ear: External ear normal.      Nose: No nasal deformity or nasal tenderness.      Right Nostril: Epistaxis (Dried clot on the nasal septum on the right.  No active bleeding appreciated this time.) present. No septal hematoma.      Left Nostril: No epistaxis.      Mouth/Throat:      Mouth: Mucous membranes are moist.   Eyes:       Extraocular Movements: Extraocular movements intact.      Pupils: Pupils are equal, round, and reactive to light.   Cardiovascular:      Rate and Rhythm: Normal rate and regular rhythm.      Pulses: Normal pulses.   Pulmonary:      Effort: Pulmonary effort is normal. No respiratory distress.      Breath sounds: Normal breath sounds.   Musculoskeletal:      Right lower leg: Edema present.      Left lower leg: Edema present.      Comments: 2+ BLE edema.   Skin:     General: Skin is warm and dry.   Neurological:      General: No focal deficit present.      Mental Status: He is alert and oriented to person, place, and time.   Psychiatric:         Mood and Affect: Mood normal.         Behavior: Behavior normal.         Procedures           ED Course  ED Course as of May 25 0959   Tue May 25, 2021   0942 The patient has intermittent epistaxis of an anterior origin on the right.  We will discharge the patient with symptomatic management and follow-up with his primary care physician for ongoing management. I had a discussion with the patient/family regarding diagnosis, diagnostic results, treatment plan, and medications.  The patient/family indicated understanding of these instructions.  I spent adequate time at the bedside proceeding discharge necessary to personally discuss the aftercare instructions, giving patient education, providing explanations of the results of our evaluations/findings, and my decision making to assure that the patient/family understand the plan of care.  Time was allotted to answer questions at that time and throughout the ED course.  Emphasis was placed on timely follow-up after discharge.  I also discussed the potential for the development of an acute emergent condition requiring further evaluation, admission, or even surgical intervention. I discussed that we found nothing during the visit today indicating the need for further workup, admission, or the presence of an unstable medical condition.   I encouraged the patient to return to the emergency department immediately for ANY concerns, worsening, new complaints, or if symptoms persist and unable to seek follow-up in a timely fashion.  The patient/family expressed understanding and agreement with this plan.     [RS]   7744 I did stress with the patient prior to discharge the importance of taking his blood pressure medications as prescribed.  Patient is on Lasix and takes it intermittently though he did get a new prescription filled yesterday.  Patient states he takes another medication but states he is unable to read the bottle secondary to it being so fading.  Are giving him a dose of medication here but he needs to follow-up with his doctor soon as possible and take those medications as prescribed.  He voiced understanding and was agreeable.    [RS]      ED Course User Index  [RS] Mono Sanchez MD                                           [facility]    Final diagnoses:   Acute anterior epistaxis   Elevated blood pressure reading with diagnosis of hypertension       ED Disposition  ED Disposition     ED Disposition Condition Comment    Discharge Stable           [facility] Emergency Department  [address] [id number] [phone]    As needed, If symptoms worsen or ANY concerns.    Bentley Hannah MD  [address] [place] [address]  [phone]               Medication List      New Prescriptions    fexofenadine 180 MG tablet  Commonly known as: Allegra Allergy  Take 1 tablet by mouth Daily.     fluticasone 50 MCG/ACT nasal spray  Commonly known as: FLONASE  2 sprays into the nostril(s) as directed by provider Daily.     oxymetazoline 0.05 % nasal spray  Commonly known as: AFRIN  2 sprays into the nostril(s) as directed by provider 2 (Two) Times a Day for 3 days.        Changed    furosemide 40 MG tablet  Commonly known as: LASIX  Take 1 tablet by mouth 2 (Two) Times a Day.  What changed:  when to take this        Stop    sulfaSALAzine 500 MG tablet  Commonly known as: Azulfidine           Where to Get Your Medications      You can get these medications from any pharmacy    Bring a paper prescription for each of these medications  · fexofenadine 180 MG tablet  · fluticasone 50 MCG/ACT nasal spray  · furosemide 40 MG tablet  · oxymetazoline 0.05 % nasal spray          Mono Sanchez MD  05/25/21 0510       Mono Sanchez MD  05/25/21 0591

## 2021-07-02 ENCOUNTER — HOSPITAL ENCOUNTER (EMERGENCY)
Facility: HOSPITAL | Age: 56
Discharge: HOME | End: 2021-07-02
Attending: EMERGENCY MEDICINE | Admitting: EMERGENCY MEDICINE

## 2021-07-02 VITALS
WEIGHT: 315 LBS | HEIGHT: 72 IN | OXYGEN SATURATION: 95 % | SYSTOLIC BLOOD PRESSURE: 173 MMHG | HEART RATE: 72 BPM | RESPIRATION RATE: 16 BRPM | TEMPERATURE: 98.6 F | BODY MASS INDEX: 42.66 KG/M2 | DIASTOLIC BLOOD PRESSURE: 102 MMHG

## 2021-07-02 DIAGNOSIS — I16.0 HYPERTENSIVE URGENCY: Primary | ICD-10-CM

## 2021-07-02 DIAGNOSIS — F43.9 STRESS: ICD-10-CM

## 2021-07-02 DIAGNOSIS — D72.829 LEUKOCYTOSIS, UNSPECIFIED TYPE: ICD-10-CM

## 2021-07-02 LAB
ALBUMIN SERPL-MCNC: 4.1 G/DL (ref 3.5–5.2)
ALBUMIN/GLOB SERPL: 1 G/DL
ALP SERPL-CCNC: 98 U/L (ref 39–117)
ALT SERPL W P-5'-P-CCNC: 24 U/L (ref 1–41)
ANION GAP SERPL CALCULATED.3IONS-SCNC: 14 MMOL/L (ref 5–15)
AST SERPL-CCNC: 20 U/L (ref 1–40)
BASOPHILS # BLD AUTO: 0.02 10*3/MM3 (ref 0–0.2)
BASOPHILS NFR BLD AUTO: 0.1 % (ref 0–1.5)
BILIRUB SERPL-MCNC: 0.4 MG/DL (ref 0–1.2)
BILIRUB UR QL STRIP: NEGATIVE
BUN SERPL-MCNC: 27 MG/DL (ref 6–20)
BUN/CREAT SERPL: 23.1 (ref 7–25)
CALCIUM SPEC-SCNC: 9.6 MG/DL (ref 8.6–10.5)
CHLORIDE SERPL-SCNC: 91 MMOL/L (ref 98–107)
CLARITY UR: CLEAR
CO2 SERPL-SCNC: 26 MMOL/L (ref 22–29)
COLOR UR: YELLOW
CREAT SERPL-MCNC: 1.17 MG/DL (ref 0.76–1.27)
DEPRECATED RDW RBC AUTO: 45.4 FL (ref 37–54)
EOSINOPHIL # BLD AUTO: 0 10*3/MM3 (ref 0–0.4)
EOSINOPHIL NFR BLD AUTO: 0 % (ref 0.3–6.2)
ERYTHROCYTE [DISTWIDTH] IN BLOOD BY AUTOMATED COUNT: 18.4 % (ref 12.3–15.4)
GFR SERPL CREATININE-BSD FRML MDRD: 78 ML/MIN/1.73
GLOBULIN UR ELPH-MCNC: 4 GM/DL
GLUCOSE SERPL-MCNC: 143 MG/DL (ref 65–99)
GLUCOSE UR STRIP-MCNC: NEGATIVE MG/DL
HCT VFR BLD AUTO: 42.9 % (ref 37.5–51)
HGB BLD-MCNC: 13.4 G/DL (ref 13–17.7)
HGB UR QL STRIP.AUTO: NEGATIVE
IMM GRANULOCYTES # BLD AUTO: 0.23 10*3/MM3 (ref 0–0.05)
IMM GRANULOCYTES NFR BLD AUTO: 1.1 % (ref 0–0.5)
KETONES UR QL STRIP: NEGATIVE
LEUKOCYTE ESTERASE UR QL STRIP.AUTO: NEGATIVE
LYMPHOCYTES # BLD AUTO: 1.48 10*3/MM3 (ref 0.7–3.1)
LYMPHOCYTES NFR BLD AUTO: 7.3 % (ref 19.6–45.3)
MCH RBC QN AUTO: 23.1 PG (ref 26.6–33)
MCHC RBC AUTO-ENTMCNC: 31.2 G/DL (ref 31.5–35.7)
MCV RBC AUTO: 73.8 FL (ref 79–97)
MONOCYTES # BLD AUTO: 0.47 10*3/MM3 (ref 0.1–0.9)
MONOCYTES NFR BLD AUTO: 2.3 % (ref 5–12)
NEUTROPHILS NFR BLD AUTO: 18.1 10*3/MM3 (ref 1.7–7)
NEUTROPHILS NFR BLD AUTO: 89.2 % (ref 42.7–76)
NITRITE UR QL STRIP: NEGATIVE
NRBC BLD AUTO-RTO: 0 /100 WBC (ref 0–0.2)
PH UR STRIP.AUTO: 7 [PH] (ref 5–8)
PLATELET # BLD AUTO: 221 10*3/MM3 (ref 140–450)
PMV BLD AUTO: 12 FL (ref 6–12)
POTASSIUM SERPL-SCNC: 3.4 MMOL/L (ref 3.5–5.2)
PROT SERPL-MCNC: 8.1 G/DL (ref 6–8.5)
PROT UR QL STRIP: NEGATIVE
RBC # BLD AUTO: 5.81 10*6/MM3 (ref 4.14–5.8)
SODIUM SERPL-SCNC: 131 MMOL/L (ref 136–145)
SP GR UR STRIP: 1.01 (ref 1–1.03)
TROPONIN T SERPL-MCNC: <0.01 NG/ML (ref 0–0.03)
UROBILINOGEN UR QL STRIP: NORMAL
WBC # BLD AUTO: 20.3 10*3/MM3 (ref 3.4–10.8)

## 2021-07-02 PROCEDURE — 99283 EMERGENCY DEPT VISIT LOW MDM: CPT

## 2021-07-02 PROCEDURE — 80053 COMPREHEN METABOLIC PANEL: CPT | Performed by: EMERGENCY MEDICINE

## 2021-07-02 PROCEDURE — 81003 URINALYSIS AUTO W/O SCOPE: CPT | Performed by: EMERGENCY MEDICINE

## 2021-07-02 PROCEDURE — 93005 ELECTROCARDIOGRAM TRACING: CPT | Performed by: EMERGENCY MEDICINE

## 2021-07-02 PROCEDURE — 85025 COMPLETE CBC W/AUTO DIFF WBC: CPT | Performed by: EMERGENCY MEDICINE

## 2021-07-02 PROCEDURE — 84484 ASSAY OF TROPONIN QUANT: CPT | Performed by: EMERGENCY MEDICINE

## 2021-07-02 RX ORDER — HYDRALAZINE HYDROCHLORIDE 50 MG/1
50 TABLET, FILM COATED ORAL 3 TIMES DAILY
COMMUNITY
End: 2022-09-21 | Stop reason: SDUPTHER

## 2021-07-02 RX ORDER — TRIAMTERENE AND HYDROCHLOROTHIAZIDE 37.5; 25 MG/1; MG/1
1 TABLET ORAL DAILY
COMMUNITY

## 2021-07-02 RX ORDER — CLONIDINE HYDROCHLORIDE 0.2 MG/1
0.2 TABLET ORAL 2 TIMES DAILY
COMMUNITY
End: 2022-09-21 | Stop reason: SDUPTHER

## 2021-07-02 RX ORDER — FOLIC ACID 1 MG/1
1 TABLET ORAL DAILY
COMMUNITY
End: 2022-09-21 | Stop reason: SDUPTHER

## 2021-07-02 RX ORDER — ALPRAZOLAM 0.25 MG/1
0.25 TABLET ORAL 3 TIMES DAILY PRN
Qty: 4 TABLET | Refills: 0 | Status: SHIPPED | OUTPATIENT
Start: 2021-07-02

## 2021-07-02 NOTE — ED PROVIDER NOTES
Subjective   Pt is a pleasant 55 year old male who presents with hypertension.  He has a history of HTN and has recently been for focused on better control.  He states that he has seen and is currently working with his PCP, but currently control is inconsistent.  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  He woke up this morning and his BP was close to 200 systolic, prompting this ED visit.  This was prior to taking his morning medications and he has since taken his morning medications as prescribed.  He denies associated symptoms.  Denies fever, chills, chest pain, shortness of breath, abdominal pain, nausea, vomiting, diarrhea, or neurological complaints.          Review of Systems   Constitutional: Negative for fever.   Respiratory: Negative for chest tightness and shortness of breath.    Cardiovascular: Negative for chest pain.   Gastrointestinal: Negative for abdominal pain and vomiting.   Musculoskeletal: Positive for arthralgias (Baseline arthritis).   All other systems reviewed and are negative.      Past Medical History:   Diagnosis Date   • Arthritis    • Hypertension        No Known Allergies    History reviewed. No pertinent surgical history.    History reviewed. No pertinent family history.    Social History     Socioeconomic History   • Marital status: Single     Spouse name: Not on file   • Number of children: Not on file   • Years of education: Not on file   • Highest education level: Not on file   Tobacco Use   • Smoking status: Never Smoker   • Smokeless tobacco: Never Used   Substance and Sexual Activity   • Alcohol use: Yes     Comment: RARE   • Drug use: No   • Sexual activity: Defer           Objective   Physical Exam  Vitals and nursing note reviewed.   Constitutional:       General: He is not in acute distress.     Appearance: Normal appearance. He is obese. He is not ill-appearing,  toxic-appearing or diaphoretic.   HENT:      Head: Normocephalic and atraumatic.   Eyes:      Conjunctiva/sclera: Conjunctivae normal.      Pupils: Pupils are equal, round, and reactive to light.   Neck:      Thyroid: No thyromegaly.   Cardiovascular:      Rate and Rhythm: Normal rate and regular rhythm.      Heart sounds: Normal heart sounds. No murmur heard.   No friction rub. No gallop.    Pulmonary:      Effort: Pulmonary effort is normal. No respiratory distress.      Breath sounds: Normal breath sounds.   Abdominal:      General: Bowel sounds are normal.      Palpations: Abdomen is soft.      Tenderness: There is no abdominal tenderness.   Musculoskeletal:         General: Normal range of motion.      Cervical back: Normal range of motion.   Lymphadenopathy:      Cervical: No cervical adenopathy.   Skin:     General: Skin is warm and dry.      Capillary Refill: Capillary refill takes less than 2 seconds.   Neurological:      General: No focal deficit present.      Mental Status: He is alert and oriented to person, place, and time. Mental status is at baseline.   Psychiatric:         Mood and Affect: Mood normal.         Behavior: Behavior normal.         Procedures           ED Course  ED Course as of Jul 03 1058 Fri Jul 02, 2021 1058 Patient unfortunately has multiple stressors, both job and relationship.  Patient states that he and his significant other of 22 years are having a difficult time and she is refusing to to leave the house.  This is main stressor at this time and also has job stressors.  Part of his hypertension could be related to this.  Although is not a good solution long-term, I will give him a brief course of Xanax.  If this does significantly improve his blood pressure, which he records twice daily, stress control, preferably not chronic benzodiazepines, may be an avenue to control his blood pressure and a repeat physician visits.    Additionally, the patient will follow up at the  cardiology hypertension clinic.  Patient understands have a low threshold to return to the emergency department symptoms return or other concerns arise.    Patient recently completed a course of steroids, which is consistent with the leukocytosis noted on CBC.  Patient does not have any infectious symptoms or further concerns which which would warrant more aggressive work-up at this time.    [CP]      ED Course User Index  [CP] Percy Shetty, DO      No results found for this or any previous visit (from the past 24 hour(s)).  Note: In addition to lab results from this visit, the labs listed above may include labs taken at another facility or during a different encounter within the last 24 hours. Please correlate lab times with ED admission and discharge times for further clarification of the services performed during this visit.    No orders to display     Vitals:    07/02/21 1030 07/02/21 1055 07/02/21 1107 07/02/21 1114   BP: 177/94  148/76 (!) 173/102   BP Location:   Left arm    Patient Position:   Lying    Pulse: 71 71 86 72   Resp:   18 16   Temp:   98.6 °F (37 °C)    TempSrc:   Axillary    SpO2: 94% 95% 99% 95%   Weight:       Height:         Medications - No data to display  ECG/EMG Results (last 24 hours)     ** No results found for the last 24 hours. **        ECG 12 Lead   Final Result   Test Reason : HTN   Blood Pressure :   */*   mmHG   Vent. Rate :  75 BPM     Atrial Rate :  75 BPM      P-R Int : 224 ms          QRS Dur : 100 ms       QT Int : 424 ms       P-R-T Axes :  59   2 113 degrees      QTc Int : 473 ms      Sinus rhythm with 1st degree AV block   T wave abnormality, consider lateral ischemia   Prolonged QT   Abnormal ECG   No previous ECGs available   Confirmed by MD SHETTY CORY ([id number]) on 7/3/2021 9:41:12 AM      Referred By: EDMD           Confirmed By: PERCY SHETTY MD        Results for JODY JONES ANDREW (MRN [id number]) as of 7/3/2021 10:58   Ref. Range 7/2/2021 08:48   Troponin T Latest Ref  Range: 0.000 - 0.030 ng/mL <0.010   Glucose Latest Ref Range: 65 - 99 mg/dL 143 (H)   Sodium Latest Ref Range: 136 - 145 mmol/L 131 (L)   Potassium Latest Ref Range: 3.5 - 5.2 mmol/L 3.4 (L)   CO2 Latest Ref Range: 22.0 - 29.0 mmol/L 26.0   Chloride Latest Ref Range: 98 - 107 mmol/L 91 (L)   Anion Gap Latest Ref Range: 5.0 - 15.0 mmol/L 14.0   Creatinine Latest Ref Range: 0.76 - 1.27 mg/dL 1.17   BUN Latest Ref Range: 6 - 20 mg/dL 27 (H)   BUN/Creatinine Ratio Latest Ref Range: 7.0 - 25.0  23.1   Calcium Latest Ref Range: 8.6 - 10.5 mg/dL 9.6   eGFR African Am Latest Ref Range: >60 mL/min/1.73 78   Alkaline Phosphatase Latest Ref Range: 39 - 117 U/L 98   Total Protein Latest Ref Range: 6.0 - 8.5 g/dL 8.1   ALT (SGPT) Latest Ref Range: 1 - 41 U/L 24   AST (SGOT) Latest Ref Range: 1 - 40 U/L 20   Total Bilirubin Latest Ref Range: 0.0 - 1.2 mg/dL 0.4   Albumin Latest Ref Range: 3.50 - 5.20 g/dL 4.10   Globulin Latest Units: gm/dL 4.0   A/G Ratio Latest Units: g/dL 1.0   WBC Latest Ref Range: 3.40 - 10.80 10*3/mm3 20.30 (H)   RBC Latest Ref Range: 4.14 - 5.80 10*6/mm3 5.81 (H)   Hemoglobin Latest Ref Range: 13.0 - 17.7 g/dL 13.4   Hematocrit Latest Ref Range: 37.5 - 51.0 % 42.9   RDW Latest Ref Range: 12.3 - 15.4 % 18.4 (H)   MCV Latest Ref Range: 79.0 - 97.0 fL 73.8 (L)   MCH Latest Ref Range: 26.6 - 33.0 pg 23.1 (L)   MCHC Latest Ref Range: 31.5 - 35.7 g/dL 31.2 (L)   MPV Latest Ref Range: 6.0 - 12.0 fL 12.0   Platelets Latest Ref Range: 140 - 450 10*3/mm3 221   RDW-SD Latest Ref Range: 37.0 - 54.0 fl 45.4   Neutrophil Rel % Latest Ref Range: 42.7 - 76.0 % 89.2 (H)   Lymphocyte Rel % Latest Ref Range: 19.6 - 45.3 % 7.3 (L)   Monocyte Rel % Latest Ref Range: 5.0 - 12.0 % 2.3 (L)   Eosinophil Rel % Latest Ref Range: 0.3 - 6.2 % 0.0 (L)   Basophil Rel % Latest Ref Range: 0.0 - 1.5 % 0.1   Immature Granulocyte Rel % Latest Ref Range: 0.0 - 0.5 % 1.1 (H)   Neutrophils Absolute Latest Ref Range: 1.70 - 7.00 10*3/mm3 18.10  (H)   Lymphocytes Absolute Latest Ref Range: 0.70 - 3.10 10*3/mm3 1.48   Monocytes Absolute Latest Ref Range: 0.10 - 0.90 10*3/mm3 0.47   Eosinophils Absolute Latest Ref Range: 0.00 - 0.40 10*3/mm3 0.00   Basophils Absolute Latest Ref Range: 0.00 - 0.20 10*3/mm3 0.02   Immature Grans, Absolute Latest Ref Range: 0.00 - 0.05 10*3/mm3 0.23 (H)   nRBC Latest Ref Range: 0.0 - 0.2 /100 WBC 0.0     Results for JODY JONES (MRN [id number]) as of 7/3/2021 10:58   Ref. Range 7/2/2021 08:49   Color, UA Latest Ref Range: Yellow, Straw  Yellow   Appearance, UA Latest Ref Range: Clear  Clear   Specific Hope, UA Latest Ref Range: 1.001 - 1.030  1.011   pH, UA Latest Ref Range: 5.0 - 8.0  7.0   Glucose Latest Ref Range: Negative  Negative   Ketones, UA Latest Ref Range: Negative  Negative   Blood, UA Latest Ref Range: Negative  Negative   Nitrite, UA Latest Ref Range: Negative  Negative   Leukocytes, UA Latest Ref Range: Negative  Negative   Protein, UA Latest Ref Range: Negative  Negative   Bilirubin, UA Latest Ref Range: Negative  Negative   Urobilinogen, UA Latest Ref Range: 0.2 - 1.0 E.U./dL  0.2 E.U./dL             MDM    Final diagnoses:   Hypertensive urgency   Leukocytosis, unspecified type   Stress       ED Disposition  ED Disposition     ED Disposition Condition Comment    Discharge Stable         DISCHARGE    Patient discharged in stable condition.    Reviewed implications of results, diagnosis, meds, responsibility to follow up, warning signs and symptoms of possible worsening, potential complications and reasons to return to ER.    Patient/Family voiced understanding of above instructions.    Discussed plan for discharge, as there is no emergent indication for admission.  Pt/family is agreeable and understands need for follow up and possible repeat testing.  Pt/family is aware that discharge does not mean that nothing is wrong but that it indicates no emergency is currently present that requires admission and  they must continue care with follow-up as given below or with a physician of their choice.     FOLLOW-UP  Jarett Gonzales MD  [address] [id number] [phone]    Schedule an appointment as soon as possible for a visit       [facility] CARDIOLOGY  [address]  [address]  [facility] [id number] [phone]             Medication List      New Prescriptions    ALPRAZolam 0.25 MG tablet  Commonly known as: XANAX  Take 1 tablet by mouth 3 (Three) Times a Day As Needed for Anxiety.           Where to Get Your Medications      These medications were sent to [facility] [address] [place] [address] [address] [employer] [facility] AT [facility] [address]  - [phone]  - [phone] [address] [facility] [address]    Phone: [phone]   · ALPRAZolam 0.25 MG tablet           No follow-up provider specified.       Medication List      No changes were made to your prescriptions during this visit.          Alexis Shetty,   07/03/21 1051

## 2021-07-03 LAB
QT INTERVAL: 424 MS
QTC INTERVAL: 473 MS

## 2021-10-27 ENCOUNTER — APPOINTMENT (OUTPATIENT)
Dept: CT IMAGING | Facility: HOSPITAL | Age: 56
End: 2021-10-27

## 2021-10-27 ENCOUNTER — APPOINTMENT (OUTPATIENT)
Dept: GENERAL RADIOLOGY | Facility: HOSPITAL | Age: 56
End: 2021-10-27

## 2021-10-27 ENCOUNTER — HOSPITAL ENCOUNTER (EMERGENCY)
Facility: HOSPITAL | Age: 56
Discharge: HOME | End: 2021-10-28
Attending: EMERGENCY MEDICINE | Admitting: EMERGENCY MEDICINE

## 2021-10-27 DIAGNOSIS — M19.90 ARTHRITIS: ICD-10-CM

## 2021-10-27 DIAGNOSIS — Z87.39 HISTORY OF RHEUMATOID ARTHRITIS: ICD-10-CM

## 2021-10-27 DIAGNOSIS — S90.31XA HEMATOMA OF RIGHT FOOT: Primary | ICD-10-CM

## 2021-10-27 DIAGNOSIS — I10 ESSENTIAL HYPERTENSION: ICD-10-CM

## 2021-10-27 LAB
ALBUMIN SERPL-MCNC: 3.8 G/DL (ref 3.5–5.2)
ALBUMIN/GLOB SERPL: 1 G/DL
ALP SERPL-CCNC: 89 U/L (ref 39–117)
ALT SERPL W P-5'-P-CCNC: 21 U/L (ref 1–41)
ANION GAP SERPL CALCULATED.3IONS-SCNC: 12 MMOL/L (ref 5–15)
AST SERPL-CCNC: 29 U/L (ref 1–40)
BASOPHILS # BLD AUTO: 0.05 10*3/MM3 (ref 0–0.2)
BASOPHILS NFR BLD AUTO: 0.5 % (ref 0–1.5)
BILIRUB SERPL-MCNC: 0.3 MG/DL (ref 0–1.2)
BUN SERPL-MCNC: 21 MG/DL (ref 6–20)
BUN/CREAT SERPL: 16.7 (ref 7–25)
CALCIUM SPEC-SCNC: 8.8 MG/DL (ref 8.6–10.5)
CHLORIDE SERPL-SCNC: 97 MMOL/L (ref 98–107)
CO2 SERPL-SCNC: 26 MMOL/L (ref 22–29)
CREAT SERPL-MCNC: 1.26 MG/DL (ref 0.76–1.27)
CRP SERPL-MCNC: 2.24 MG/DL (ref 0–0.5)
D-LACTATE SERPL-SCNC: 1.8 MMOL/L (ref 0.5–2)
DEPRECATED RDW RBC AUTO: 42.5 FL (ref 37–54)
EOSINOPHIL # BLD AUTO: 0.35 10*3/MM3 (ref 0–0.4)
EOSINOPHIL NFR BLD AUTO: 3.6 % (ref 0.3–6.2)
ERYTHROCYTE [DISTWIDTH] IN BLOOD BY AUTOMATED COUNT: 16.4 % (ref 12.3–15.4)
ERYTHROCYTE [SEDIMENTATION RATE] IN BLOOD: 73 MM/HR (ref 0–20)
GFR SERPL CREATININE-BSD FRML MDRD: 71 ML/MIN/1.73
GLOBULIN UR ELPH-MCNC: 3.9 GM/DL
GLUCOSE SERPL-MCNC: 97 MG/DL (ref 65–99)
HCT VFR BLD AUTO: 39.3 % (ref 37.5–51)
HGB BLD-MCNC: 12.2 G/DL (ref 13–17.7)
IMM GRANULOCYTES # BLD AUTO: 0.04 10*3/MM3 (ref 0–0.05)
IMM GRANULOCYTES NFR BLD AUTO: 0.4 % (ref 0–0.5)
LYMPHOCYTES # BLD AUTO: 1.85 10*3/MM3 (ref 0.7–3.1)
LYMPHOCYTES NFR BLD AUTO: 19.3 % (ref 19.6–45.3)
MCH RBC QN AUTO: 23.1 PG (ref 26.6–33)
MCHC RBC AUTO-ENTMCNC: 31 G/DL (ref 31.5–35.7)
MCV RBC AUTO: 74.3 FL (ref 79–97)
MONOCYTES # BLD AUTO: 0.99 10*3/MM3 (ref 0.1–0.9)
MONOCYTES NFR BLD AUTO: 10.3 % (ref 5–12)
NEUTROPHILS NFR BLD AUTO: 6.32 10*3/MM3 (ref 1.7–7)
NEUTROPHILS NFR BLD AUTO: 65.9 % (ref 42.7–76)
NRBC BLD AUTO-RTO: 0 /100 WBC (ref 0–0.2)
PLATELET # BLD AUTO: 249 10*3/MM3 (ref 140–450)
PMV BLD AUTO: 12.2 FL (ref 6–12)
POTASSIUM SERPL-SCNC: 3.5 MMOL/L (ref 3.5–5.2)
PROT SERPL-MCNC: 7.7 G/DL (ref 6–8.5)
RBC # BLD AUTO: 5.29 10*6/MM3 (ref 4.14–5.8)
SODIUM SERPL-SCNC: 135 MMOL/L (ref 136–145)
WBC # BLD AUTO: 9.6 10*3/MM3 (ref 3.4–10.8)

## 2021-10-27 PROCEDURE — 73630 X-RAY EXAM OF FOOT: CPT

## 2021-10-27 PROCEDURE — 86140 C-REACTIVE PROTEIN: CPT | Performed by: EMERGENCY MEDICINE

## 2021-10-27 PROCEDURE — 25010000002 IOPAMIDOL 61 % SOLUTION: Performed by: EMERGENCY MEDICINE

## 2021-10-27 PROCEDURE — 80053 COMPREHEN METABOLIC PANEL: CPT

## 2021-10-27 PROCEDURE — 25010000002 HYDROMORPHONE 1 MG/ML SOLUTION: Performed by: EMERGENCY MEDICINE

## 2021-10-27 PROCEDURE — 87040 BLOOD CULTURE FOR BACTERIA: CPT | Performed by: EMERGENCY MEDICINE

## 2021-10-27 PROCEDURE — 85025 COMPLETE CBC W/AUTO DIFF WBC: CPT

## 2021-10-27 PROCEDURE — 99284 EMERGENCY DEPT VISIT MOD MDM: CPT

## 2021-10-27 PROCEDURE — 83605 ASSAY OF LACTIC ACID: CPT | Performed by: EMERGENCY MEDICINE

## 2021-10-27 PROCEDURE — 85652 RBC SED RATE AUTOMATED: CPT | Performed by: EMERGENCY MEDICINE

## 2021-10-27 PROCEDURE — 25010000002 ONDANSETRON PER 1 MG: Performed by: EMERGENCY MEDICINE

## 2021-10-27 PROCEDURE — 96375 TX/PRO/DX INJ NEW DRUG ADDON: CPT

## 2021-10-27 PROCEDURE — 73701 CT LOWER EXTREMITY W/DYE: CPT

## 2021-10-27 PROCEDURE — 96374 THER/PROPH/DIAG INJ IV PUSH: CPT

## 2021-10-27 RX ORDER — ONDANSETRON 2 MG/ML
4 INJECTION INTRAMUSCULAR; INTRAVENOUS ONCE
Status: COMPLETED | OUTPATIENT
Start: 2021-10-27 | End: 2021-10-27

## 2021-10-27 RX ORDER — LIDOCAINE HYDROCHLORIDE AND EPINEPHRINE 10; 10 MG/ML; UG/ML
10 INJECTION, SOLUTION INFILTRATION; PERINEURAL ONCE
Status: COMPLETED | OUTPATIENT
Start: 2021-10-27 | End: 2021-10-28

## 2021-10-27 RX ORDER — SODIUM CHLORIDE 0.9 % (FLUSH) 0.9 %
10 SYRINGE (ML) INJECTION AS NEEDED
Status: DISCONTINUED | OUTPATIENT
Start: 2021-10-27 | End: 2021-10-28 | Stop reason: HOSPADM

## 2021-10-27 RX ADMIN — HYDROMORPHONE HYDROCHLORIDE 1 MG: 1 INJECTION, SOLUTION INTRAMUSCULAR; INTRAVENOUS; SUBCUTANEOUS at 21:12

## 2021-10-27 RX ADMIN — IOPAMIDOL 100 ML: 612 INJECTION, SOLUTION INTRAVENOUS at 22:58

## 2021-10-27 RX ADMIN — ONDANSETRON 4 MG: 2 INJECTION INTRAMUSCULAR; INTRAVENOUS at 21:13

## 2021-10-28 VITALS
OXYGEN SATURATION: 99 % | WEIGHT: 315 LBS | BODY MASS INDEX: 42.66 KG/M2 | TEMPERATURE: 98.6 F | RESPIRATION RATE: 18 BRPM | DIASTOLIC BLOOD PRESSURE: 105 MMHG | HEART RATE: 70 BPM | SYSTOLIC BLOOD PRESSURE: 199 MMHG | HEIGHT: 72 IN

## 2021-10-28 RX ORDER — OXYCODONE HYDROCHLORIDE AND ACETAMINOPHEN 5; 325 MG/1; MG/1
1 TABLET ORAL EVERY 6 HOURS PRN
Qty: 12 TABLET | Refills: 0 | Status: SHIPPED | OUTPATIENT
Start: 2021-10-28

## 2021-10-28 RX ORDER — OXYCODONE HYDROCHLORIDE AND ACETAMINOPHEN 5; 325 MG/1; MG/1
1 TABLET ORAL ONCE
Status: COMPLETED | OUTPATIENT
Start: 2021-10-28 | End: 2021-10-28

## 2021-10-28 RX ADMIN — OXYCODONE HYDROCHLORIDE AND ACETAMINOPHEN 1 TABLET: 5; 325 TABLET ORAL at 00:55

## 2021-10-28 RX ADMIN — LIDOCAINE HYDROCHLORIDE AND EPINEPHRINE 10 ML: 10; 10 INJECTION, SOLUTION INFILTRATION; PERINEURAL at 00:24

## 2021-11-01 LAB
BACTERIA SPEC AEROBE CULT: NORMAL
BACTERIA SPEC AEROBE CULT: NORMAL

## 2022-09-21 ENCOUNTER — HOSPITAL ENCOUNTER (EMERGENCY)
Facility: HOSPITAL | Age: 57
Discharge: HOME | End: 2022-09-21
Attending: EMERGENCY MEDICINE | Admitting: EMERGENCY MEDICINE

## 2022-09-21 VITALS
HEIGHT: 73 IN | BODY MASS INDEX: 41.75 KG/M2 | OXYGEN SATURATION: 99 % | RESPIRATION RATE: 18 BRPM | DIASTOLIC BLOOD PRESSURE: 105 MMHG | HEART RATE: 77 BPM | WEIGHT: 315 LBS | TEMPERATURE: 98.8 F | SYSTOLIC BLOOD PRESSURE: 201 MMHG

## 2022-09-21 DIAGNOSIS — Z87.39 HISTORY OF RHEUMATOID ARTHRITIS: ICD-10-CM

## 2022-09-21 DIAGNOSIS — Z86.79 HISTORY OF HYPERTENSION: ICD-10-CM

## 2022-09-21 DIAGNOSIS — G89.29 CHRONIC PAIN OF LEFT ANKLE: Primary | ICD-10-CM

## 2022-09-21 DIAGNOSIS — M25.572 CHRONIC PAIN OF LEFT ANKLE: Primary | ICD-10-CM

## 2022-09-21 DIAGNOSIS — Z76.0 MEDICATION REFILL: ICD-10-CM

## 2022-09-21 LAB
ALBUMIN SERPL-MCNC: 3.7 G/DL (ref 3.5–5.2)
ALBUMIN/GLOB SERPL: 0.9 G/DL
ALP SERPL-CCNC: 96 U/L (ref 39–117)
ALT SERPL W P-5'-P-CCNC: 9 U/L (ref 1–41)
ANION GAP SERPL CALCULATED.3IONS-SCNC: 9 MMOL/L (ref 5–15)
AST SERPL-CCNC: 16 U/L (ref 1–40)
BASOPHILS # BLD AUTO: 0.06 10*3/MM3 (ref 0–0.2)
BASOPHILS NFR BLD AUTO: 0.6 % (ref 0–1.5)
BILIRUB SERPL-MCNC: 0.5 MG/DL (ref 0–1.2)
BUN SERPL-MCNC: 11 MG/DL (ref 6–20)
BUN/CREAT SERPL: 15.1 (ref 7–25)
CALCIUM SPEC-SCNC: 8.9 MG/DL (ref 8.6–10.5)
CHLORIDE SERPL-SCNC: 102 MMOL/L (ref 98–107)
CO2 SERPL-SCNC: 30 MMOL/L (ref 22–29)
CREAT SERPL-MCNC: 0.73 MG/DL (ref 0.76–1.27)
DEPRECATED RDW RBC AUTO: 47.7 FL (ref 37–54)
EGFRCR SERPLBLD CKD-EPI 2021: 105.5 ML/MIN/1.73
EOSINOPHIL # BLD AUTO: 0.49 10*3/MM3 (ref 0–0.4)
EOSINOPHIL NFR BLD AUTO: 4.9 % (ref 0.3–6.2)
ERYTHROCYTE [DISTWIDTH] IN BLOOD BY AUTOMATED COUNT: 19.2 % (ref 12.3–15.4)
GLOBULIN UR ELPH-MCNC: 3.9 GM/DL
GLUCOSE SERPL-MCNC: 96 MG/DL (ref 65–99)
HCT VFR BLD AUTO: 41.4 % (ref 37.5–51)
HGB BLD-MCNC: 12.7 G/DL (ref 13–17.7)
HOLD SPECIMEN: NORMAL
IMM GRANULOCYTES # BLD AUTO: 0.04 10*3/MM3 (ref 0–0.05)
IMM GRANULOCYTES NFR BLD AUTO: 0.4 % (ref 0–0.5)
LYMPHOCYTES # BLD AUTO: 1.45 10*3/MM3 (ref 0.7–3.1)
LYMPHOCYTES NFR BLD AUTO: 14.4 % (ref 19.6–45.3)
MCH RBC QN AUTO: 22.8 PG (ref 26.6–33)
MCHC RBC AUTO-ENTMCNC: 30.7 G/DL (ref 31.5–35.7)
MCV RBC AUTO: 74.3 FL (ref 79–97)
MONOCYTES # BLD AUTO: 0.94 10*3/MM3 (ref 0.1–0.9)
MONOCYTES NFR BLD AUTO: 9.3 % (ref 5–12)
NEUTROPHILS NFR BLD AUTO: 7.08 10*3/MM3 (ref 1.7–7)
NEUTROPHILS NFR BLD AUTO: 70.4 % (ref 42.7–76)
NRBC BLD AUTO-RTO: 0 /100 WBC (ref 0–0.2)
PLATELET # BLD AUTO: 172 10*3/MM3 (ref 140–450)
PMV BLD AUTO: 12.4 FL (ref 6–12)
POTASSIUM SERPL-SCNC: 3.7 MMOL/L (ref 3.5–5.2)
PROT SERPL-MCNC: 7.6 G/DL (ref 6–8.5)
RBC # BLD AUTO: 5.57 10*6/MM3 (ref 4.14–5.8)
SODIUM SERPL-SCNC: 141 MMOL/L (ref 136–145)
WBC NRBC COR # BLD: 10.06 10*3/MM3 (ref 3.4–10.8)
WHOLE BLOOD HOLD COAG: NORMAL
WHOLE BLOOD HOLD SPECIMEN: NORMAL

## 2022-09-21 PROCEDURE — 80053 COMPREHEN METABOLIC PANEL: CPT

## 2022-09-21 PROCEDURE — 85025 COMPLETE CBC W/AUTO DIFF WBC: CPT

## 2022-09-21 PROCEDURE — 99283 EMERGENCY DEPT VISIT LOW MDM: CPT

## 2022-09-21 PROCEDURE — 36415 COLL VENOUS BLD VENIPUNCTURE: CPT

## 2022-09-21 RX ORDER — FOLIC ACID 1 MG/1
1 TABLET ORAL DAILY
Qty: 30 TABLET | Refills: 0 | Status: SHIPPED | OUTPATIENT
Start: 2022-09-21 | End: 2022-10-21

## 2022-09-21 RX ORDER — SODIUM CHLORIDE 0.9 % (FLUSH) 0.9 %
10 SYRINGE (ML) INJECTION AS NEEDED
Status: DISCONTINUED | OUTPATIENT
Start: 2022-09-21 | End: 2022-09-21 | Stop reason: HOSPADM

## 2022-09-21 RX ORDER — HYDRALAZINE HYDROCHLORIDE 50 MG/1
50 TABLET, FILM COATED ORAL 3 TIMES DAILY
Qty: 90 TABLET | Refills: 0 | Status: SHIPPED | OUTPATIENT
Start: 2022-09-21 | End: 2022-10-21

## 2022-09-21 RX ORDER — FUROSEMIDE 40 MG/1
40 TABLET ORAL 2 TIMES DAILY
Qty: 60 TABLET | Refills: 0 | Status: SHIPPED | OUTPATIENT
Start: 2022-09-21 | End: 2022-10-21

## 2022-09-21 RX ORDER — CLONIDINE HYDROCHLORIDE 0.2 MG/1
0.2 TABLET ORAL 2 TIMES DAILY
Qty: 60 TABLET | Refills: 0 | Status: SHIPPED | OUTPATIENT
Start: 2022-09-21 | End: 2022-10-21

## 2022-09-21 NOTE — ED PROVIDER NOTES
Subjective   History of Present Illness  Pt is a 58 yo male presenting to ED with complaints of ankle / foot pain and swelling. PMHx significant for HTN, RA, ROHIT, Anxiety and Obesity. Pt came to ED with complaints of left ankle / foot pain. He reports he has had intermittent pain in his bilateral LE for 6 + years and has Rheumatoid arthritis. He denies any injury. He denies redness, numbness or weakness to LE. He does admit he has been out of several medications including his Lasix and BP meds for several days. He is followed by PCP / Rheumatology at [facility] but reports he plans to establish new doctors at [facility]. He denies fever, chills, dizziness, CP, SOB, cough, N/V/D, abdominal pain or urinary sx.     History provided by:  Medical records and patient      Review of Systems   Constitutional: Negative for chills and fever.   HENT: Negative for congestion.    Eyes: Negative for visual disturbance.   Respiratory: Negative for cough and shortness of breath.    Cardiovascular: Negative for chest pain.   Gastrointestinal: Negative for abdominal pain, diarrhea, nausea and vomiting.   Genitourinary: Negative for difficulty urinating.   Musculoskeletal: Positive for arthralgias (left ankle). Negative for back pain and neck pain.   Skin: Negative for color change and wound.   Neurological: Negative for dizziness, weakness and numbness.       Past Medical History:   Diagnosis Date   • Arthritis    • Hypertension        No Known Allergies    No past surgical history on file.    No family history on file.    Social History     Socioeconomic History   • Marital status: Single   Tobacco Use   • Smoking status: Never Smoker   • Smokeless tobacco: Current User     Types: Chew   Substance and Sexual Activity   • Alcohol use: Yes     Comment: socially   • Drug use: No   • Sexual activity: Defer           Objective   Physical Exam  Vitals and nursing note reviewed.   Constitutional:       General: He is not in acute distress.      Appearance: He is obese.   HENT:      Head: Atraumatic.   Eyes:      Extraocular Movements: Extraocular movements intact.      Conjunctiva/sclera: Conjunctivae normal.   Cardiovascular:      Rate and Rhythm: Normal rate.   Pulmonary:      Effort: Pulmonary effort is normal. No respiratory distress.   Musculoskeletal:      Cervical back: Normal range of motion and neck supple.      Right lower leg: Edema present.      Left lower leg: Edema present.      Right ankle: Swelling present. No tenderness. Normal range of motion.      Left ankle: Swelling present. No tenderness. Normal range of motion.      Right foot: Normal range of motion and normal capillary refill. No deformity or tenderness. Normal pulse.      Left foot: Normal range of motion and normal capillary refill. No deformity or tenderness. Normal pulse.      Comments: No erythema. Generalized +1 pitting edema to lower extremities and ankles.    Skin:     General: Skin is warm.      Capillary Refill: Capillary refill takes less than 2 seconds.   Neurological:      General: No focal deficit present.      Mental Status: He is alert and oriented to person, place, and time.   Psychiatric:         Mood and Affect: Mood normal.         Behavior: Behavior normal.         Procedures           ED Course      Discussed results and tx plan. Patient again reports chronic ankle / foot pain. He has been out of several medications. Will refill meds and explained importance of close f/u with PCP. Went over new / worse sx to return to ED. No findings of infectious joint / cellulitis at this time.     Reviewed old records.     Recent Results (from the past 24 hour(s))   Comprehensive Metabolic Panel    Collection Time: 09/21/22 12:50 PM    Specimen: Blood   Result Value Ref Range    Glucose 96 65 - 99 mg/dL    BUN 11 6 - 20 mg/dL    Creatinine 0.73 (L) 0.76 - 1.27 mg/dL    Sodium 141 136 - 145 mmol/L    Potassium 3.7 3.5 - 5.2 mmol/L    Chloride 102 98 - 107 mmol/L    CO2 30.0  (H) 22.0 - 29.0 mmol/L    Calcium 8.9 8.6 - 10.5 mg/dL    Total Protein 7.6 6.0 - 8.5 g/dL    Albumin 3.70 3.50 - 5.20 g/dL    ALT (SGPT) 9 1 - 41 U/L    AST (SGOT) 16 1 - 40 U/L    Alkaline Phosphatase 96 39 - 117 U/L    Total Bilirubin 0.5 0.0 - 1.2 mg/dL    Globulin 3.9 gm/dL    A/G Ratio 0.9 g/dL    BUN/Creatinine Ratio 15.1 7.0 - 25.0    Anion Gap 9.0 5.0 - 15.0 mmol/L    eGFR 105.5 >60.0 mL/min/1.73   Green Top (Gel)    Collection Time: 09/21/22 12:50 PM   Result Value Ref Range    Extra Tube Hold for add-ons.    Lavender Top    Collection Time: 09/21/22 12:50 PM   Result Value Ref Range    Extra Tube hold for add-on    Gold Top - SST    Collection Time: 09/21/22 12:50 PM   Result Value Ref Range    Extra Tube Hold for add-ons.    Gray Top    Collection Time: 09/21/22 12:50 PM   Result Value Ref Range    Extra Tube Hold for add-ons.    Light Blue Top    Collection Time: 09/21/22 12:50 PM   Result Value Ref Range    Extra Tube Hold for add-ons.    CBC Auto Differential    Collection Time: 09/21/22 12:50 PM    Specimen: Blood   Result Value Ref Range    WBC 10.06 3.40 - 10.80 10*3/mm3    RBC 5.57 4.14 - 5.80 10*6/mm3    Hemoglobin 12.7 (L) 13.0 - 17.7 g/dL    Hematocrit 41.4 37.5 - 51.0 %    MCV 74.3 (L) 79.0 - 97.0 fL    MCH 22.8 (L) 26.6 - 33.0 pg    MCHC 30.7 (L) 31.5 - 35.7 g/dL    RDW 19.2 (H) 12.3 - 15.4 %    RDW-SD 47.7 37.0 - 54.0 fl    MPV 12.4 (H) 6.0 - 12.0 fL    Platelets 172 140 - 450 10*3/mm3    Neutrophil % 70.4 42.7 - 76.0 %    Lymphocyte % 14.4 (L) 19.6 - 45.3 %    Monocyte % 9.3 5.0 - 12.0 %    Eosinophil % 4.9 0.3 - 6.2 %    Basophil % 0.6 0.0 - 1.5 %    Immature Grans % 0.4 0.0 - 0.5 %    Neutrophils, Absolute 7.08 (H) 1.70 - 7.00 10*3/mm3    Lymphocytes, Absolute 1.45 0.70 - 3.10 10*3/mm3    Monocytes, Absolute 0.94 (H) 0.10 - 0.90 10*3/mm3    Eosinophils, Absolute 0.49 (H) 0.00 - 0.40 10*3/mm3    Basophils, Absolute 0.06 0.00 - 0.20 10*3/mm3    Immature Grans, Absolute 0.04 0.00 - 0.05  "10*3/mm3    nRBC 0.0 0.0 - 0.2 /100 WBC     Note: In addition to lab results from this visit, the labs listed above may include labs taken at another facility or during a different encounter within the last 24 hours. Please correlate lab times with ED admission and discharge times for further clarification of the services performed during this visit.    No orders to display     Vitals:    09/21/22 1217 09/21/22 1530 09/21/22 1600 09/21/22 1630   BP: (!) 191/101 (!) 196/94 (!) 191/99 (!) 201/105   BP Location: Left arm      Patient Position: Sitting      Pulse: 87  71 77   Resp: 18      Temp: 98.8 °F (37.1 °C)      TempSrc: Oral      SpO2: 96% 94% 97% 99%   Weight: (!) 150 kg (330 lb)      Height: 185.4 cm (73\")        Medications   sodium chloride 0.9 % flush 10 mL (has no administration in time range)     ECG/EMG Results (last 24 hours)     ** No results found for the last 24 hours. **        No orders to display       DISCHARGE    Patient discharged in stable condition.    Reviewed implications of results, diagnosis, meds, responsibility to follow up, warning signs and symptoms of possible worsening, potential complications and reasons to return to ER.    Patient/Family voiced understanding of above instructions.    Discussed plan for discharge, as there is no emergent indication for admission.  Pt/family is agreeable and understands need for follow up and possible repeat testing.  Pt/family is aware that discharge does not mean that nothing is wrong but that it indicates no emergency is currently present that requires admission and they must continue care with follow-up as given below or with a physician of their choice.     FOLLOW-UP  Jarett Gonzales MD  [address] [facility] [address]  [phone]    Schedule an appointment as soon as possible for a visit       PATIENT [facility] [address]  [phone]    Call and establish a primary care doctor.    [facility] " Emergency Department  [address] [address] [phone]    If symptoms worsen         Medication List      Changed    cloNIDine 0.2 MG tablet  Commonly known as: CATAPRES  Take 1 tablet by mouth 2 (Two) Times a Day for 30 days.  What changed: when to take this     methotrexate 2.5 MG tablet  Take 1 tablet by mouth 1 (One) Time Per Week for 4 doses.  What changed: how much to take           Where to Get Your Medications      These medications were sent to [facility] - [place], [place] - [facility] [address] AT [facility] [address] - [phone] PH - [phone]   [address], [facility] [address]    Phone: [phone]   · cloNIDine 0.2 MG tablet  · folic acid 1 MG tablet  · furosemide 40 MG tablet  · hydrALAZINE 50 MG tablet  · methotrexate 2.5 MG tablet                                            MDM    Final diagnoses:   Chronic pain of left ankle   History of rheumatoid arthritis   History of hypertension   Medication refill       ED Disposition  ED Disposition     ED Disposition   Discharge    Condition   Stable    Comment   Pt ambulated independently on d/c. Pt in no obvious distress at this time              Jarett Gonzales MD  [address] [place] [address]  [phone]    Schedule an appointment as soon as possible for a visit       PATIENT [facility] [place] [address]  [phone]    Call and establish a primary care doctor.    [facility] Emergency Department  [address] [address] [phone]    If symptoms worsen         Medication List      Changed    cloNIDine 0.2 MG tablet  Commonly known as: CATAPRES  Take 1 tablet by mouth 2 (Two) Times a Day for 30 days.  What changed: when to take this     methotrexate 2.5 MG tablet  Take 1 tablet by mouth 1 (One) Time Per Week for 4 doses.  What changed: how much to take           Where to Get Your Medications       These medications were sent to [place] [address] - [place], [place] - [address] AT [facility] [address] - [phone] PH - [phone] FX  [address] [facility] [address]    Phone: [phone]   · cloNIDine 0.2 MG tablet  · folic acid 1 MG tablet  · furosemide 40 MG tablet  · hydrALAZINE 50 MG tablet  · methotrexate 2.5 MG tablet          Ivette Clarke PA  09/21/22 3834

## 2024-07-06 ENCOUNTER — HOSPITAL ENCOUNTER (EMERGENCY)
Facility: HOSPITAL | Age: 59
Discharge: HOME | End: 2024-07-06
Attending: EMERGENCY MEDICINE
Payer: MEDICAID

## 2024-07-06 ENCOUNTER — APPOINTMENT (OUTPATIENT)
Dept: GENERAL RADIOLOGY | Facility: HOSPITAL | Age: 59
End: 2024-07-06
Payer: MEDICAID

## 2024-07-06 VITALS
BODY MASS INDEX: 42.66 KG/M2 | TEMPERATURE: 98.3 F | HEIGHT: 72 IN | DIASTOLIC BLOOD PRESSURE: 71 MMHG | OXYGEN SATURATION: 98 % | RESPIRATION RATE: 18 BRPM | HEART RATE: 75 BPM | SYSTOLIC BLOOD PRESSURE: 164 MMHG | WEIGHT: 315 LBS

## 2024-07-06 DIAGNOSIS — R60.9 DEPENDENT EDEMA: Primary | ICD-10-CM

## 2024-07-06 DIAGNOSIS — R03.0 ELEVATED BLOOD PRESSURE READING: ICD-10-CM

## 2024-07-06 LAB
ALBUMIN SERPL-MCNC: 3.7 G/DL (ref 3.5–5.2)
ALBUMIN/GLOB SERPL: 1.5 G/DL
ALP SERPL-CCNC: 68 U/L (ref 39–117)
ALT SERPL W P-5'-P-CCNC: 39 U/L (ref 1–41)
ANION GAP SERPL CALCULATED.3IONS-SCNC: 7 MMOL/L (ref 5–15)
AST SERPL-CCNC: 25 U/L (ref 1–40)
BACTERIA UR QL AUTO: ABNORMAL /HPF
BASOPHILS # BLD AUTO: 0.02 10*3/MM3 (ref 0–0.2)
BASOPHILS NFR BLD AUTO: 0.6 % (ref 0–1.5)
BILIRUB SERPL-MCNC: 0.8 MG/DL (ref 0–1.2)
BILIRUB UR QL STRIP: NEGATIVE
BUN SERPL-MCNC: 17 MG/DL (ref 6–20)
BUN/CREAT SERPL: 19.1 (ref 7–25)
CALCIUM SPEC-SCNC: 8.4 MG/DL (ref 8.6–10.5)
CHLORIDE SERPL-SCNC: 108 MMOL/L (ref 98–107)
CLARITY UR: ABNORMAL
CO2 SERPL-SCNC: 28 MMOL/L (ref 22–29)
COLOR UR: ABNORMAL
CREAT SERPL-MCNC: 0.89 MG/DL (ref 0.76–1.27)
DEPRECATED RDW RBC AUTO: 49 FL (ref 37–54)
EGFRCR SERPLBLD CKD-EPI 2021: 98.7 ML/MIN/1.73
EOSINOPHIL # BLD AUTO: 0.28 10*3/MM3 (ref 0–0.4)
EOSINOPHIL NFR BLD AUTO: 7.8 % (ref 0.3–6.2)
ERYTHROCYTE [DISTWIDTH] IN BLOOD BY AUTOMATED COUNT: 18 % (ref 12.3–15.4)
GLOBULIN UR ELPH-MCNC: 2.5 GM/DL
GLUCOSE SERPL-MCNC: 94 MG/DL (ref 65–99)
GLUCOSE UR STRIP-MCNC: NEGATIVE MG/DL
HCT VFR BLD AUTO: 37.9 % (ref 37.5–51)
HGB BLD-MCNC: 11.5 G/DL (ref 13–17.7)
HGB UR QL STRIP.AUTO: NEGATIVE
HYALINE CASTS UR QL AUTO: ABNORMAL /LPF
IMM GRANULOCYTES # BLD AUTO: 0.01 10*3/MM3 (ref 0–0.05)
IMM GRANULOCYTES NFR BLD AUTO: 0.3 % (ref 0–0.5)
INR PPP: 1.13 (ref 0.89–1.12)
KETONES UR QL STRIP: ABNORMAL
LEUKOCYTE ESTERASE UR QL STRIP.AUTO: NEGATIVE
LIPASE SERPL-CCNC: 33 U/L (ref 13–60)
LYMPHOCYTES # BLD AUTO: 1.12 10*3/MM3 (ref 0.7–3.1)
LYMPHOCYTES NFR BLD AUTO: 31.3 % (ref 19.6–45.3)
MAGNESIUM SERPL-MCNC: 1.9 MG/DL (ref 1.6–2.6)
MCH RBC QN AUTO: 23.8 PG (ref 26.6–33)
MCHC RBC AUTO-ENTMCNC: 30.3 G/DL (ref 31.5–35.7)
MCV RBC AUTO: 78.5 FL (ref 79–97)
MONOCYTES # BLD AUTO: 0.29 10*3/MM3 (ref 0.1–0.9)
MONOCYTES NFR BLD AUTO: 8.1 % (ref 5–12)
NEUTROPHILS NFR BLD AUTO: 1.86 10*3/MM3 (ref 1.7–7)
NEUTROPHILS NFR BLD AUTO: 51.9 % (ref 42.7–76)
NITRITE UR QL STRIP: NEGATIVE
NRBC BLD AUTO-RTO: 0 /100 WBC (ref 0–0.2)
NT-PROBNP SERPL-MCNC: <36 PG/ML (ref 0–900)
PH UR STRIP.AUTO: 5.5 [PH] (ref 5–8)
PLATELET # BLD AUTO: 157 10*3/MM3 (ref 140–450)
PMV BLD AUTO: 11.8 FL (ref 6–12)
POTASSIUM SERPL-SCNC: 4.3 MMOL/L (ref 3.5–5.2)
PROT SERPL-MCNC: 6.2 G/DL (ref 6–8.5)
PROT UR QL STRIP: ABNORMAL
PROTHROMBIN TIME: 14.6 SECONDS (ref 12.2–14.5)
RBC # BLD AUTO: 4.83 10*6/MM3 (ref 4.14–5.8)
RBC # UR STRIP: ABNORMAL /HPF
REF LAB TEST METHOD: ABNORMAL
SODIUM SERPL-SCNC: 143 MMOL/L (ref 136–145)
SP GR UR STRIP: 1.03 (ref 1–1.03)
SQUAMOUS #/AREA URNS HPF: ABNORMAL /HPF
TROPONIN T SERPL HS-MCNC: 12 NG/L
UROBILINOGEN UR QL STRIP: ABNORMAL
WBC # UR STRIP: ABNORMAL /HPF
WBC NRBC COR # BLD AUTO: 3.58 10*3/MM3 (ref 3.4–10.8)

## 2024-07-06 PROCEDURE — 83880 ASSAY OF NATRIURETIC PEPTIDE: CPT

## 2024-07-06 PROCEDURE — 83690 ASSAY OF LIPASE: CPT

## 2024-07-06 PROCEDURE — 85025 COMPLETE CBC W/AUTO DIFF WBC: CPT

## 2024-07-06 PROCEDURE — 81001 URINALYSIS AUTO W/SCOPE: CPT

## 2024-07-06 PROCEDURE — 84484 ASSAY OF TROPONIN QUANT: CPT

## 2024-07-06 PROCEDURE — 83735 ASSAY OF MAGNESIUM: CPT

## 2024-07-06 PROCEDURE — 85610 PROTHROMBIN TIME: CPT

## 2024-07-06 PROCEDURE — 36415 COLL VENOUS BLD VENIPUNCTURE: CPT

## 2024-07-06 PROCEDURE — 99283 EMERGENCY DEPT VISIT LOW MDM: CPT

## 2024-07-06 PROCEDURE — 80053 COMPREHEN METABOLIC PANEL: CPT

## 2024-07-06 PROCEDURE — 71045 X-RAY EXAM CHEST 1 VIEW: CPT

## 2024-07-06 RX ORDER — PREDNISONE 20 MG/1
20 TABLET ORAL 2 TIMES DAILY
COMMUNITY
End: 2024-07-15

## 2024-07-06 RX ORDER — POTASSIUM CHLORIDE 750 MG/1
20 CAPSULE, EXTENDED RELEASE ORAL 2 TIMES DAILY
COMMUNITY

## 2024-07-06 RX ORDER — VALSARTAN 160 MG/1
160 TABLET ORAL DAILY
COMMUNITY

## 2024-07-06 RX ORDER — SODIUM CHLORIDE 0.9 % (FLUSH) 0.9 %
10 SYRINGE (ML) INJECTION AS NEEDED
Status: DISCONTINUED | OUTPATIENT
Start: 2024-07-06 | End: 2024-07-06 | Stop reason: HOSPADM

## 2024-07-06 RX ORDER — FUROSEMIDE 40 MG/1
40 TABLET ORAL ONCE
Status: COMPLETED | OUTPATIENT
Start: 2024-07-06 | End: 2024-07-06

## 2024-07-06 RX ADMIN — FUROSEMIDE 40 MG: 40 TABLET ORAL at 20:43

## 2024-07-06 NOTE — ED PROVIDER NOTES
Subjective   History of Present Illness patient is a pleasant 58-year-old [facility] emergency department complaining of increased swelling and edema in the lower extremities as well as the abdominal habitus, for as much is the last 2 months.  He reports that earlier this spring his weight was 278 pounds, is currently 344.  He endorses visiting outside hospital emergency department last evening with 1 IV dose of diuretic, but very minimal urine output since then.  He also complains of the tightness in his lower extremities that he has an obvious bullae on the left lateral lower leg that appears fluid-filled.  He reports dyspnea on exertion which has increased out of proportion to typical.  In addition he complains of polydipsia all the time.    Review of Systems   Constitutional:  Positive for activity change.   HENT: Negative.     Respiratory: Negative.     Cardiovascular:  Positive for leg swelling.   Gastrointestinal: Negative.    Genitourinary: Negative.    Musculoskeletal: Negative.    Skin:  Positive for wound.   Neurological: Negative.        Past Medical History:   Diagnosis Date    Arthritis     Hypertension        No Known Allergies    No past surgical history on file.    No family history on file.    Social History     Socioeconomic History    Marital status: Single   Tobacco Use    Smoking status: Never    Smokeless tobacco: Current     Types: Chew   Substance and Sexual Activity    Alcohol use: Yes     Comment: socially    Drug use: No    Sexual activity: Defer           Objective   Physical Exam  Constitutional:       Appearance: Normal appearance. He is obese.   HENT:      Head: Normocephalic and atraumatic.      Right Ear: External ear normal.      Left Ear: External ear normal.   Eyes:      Extraocular Movements: Extraocular movements intact.      Pupils: Pupils are equal, round, and reactive to light.   Cardiovascular:      Rate and Rhythm: Normal rate.   Pulmonary:      Effort: Pulmonary effort is  normal. No respiratory distress.      Breath sounds: Normal breath sounds.   Abdominal:      General: Abdomen is flat. Bowel sounds are normal. There is distension.      Palpations: Abdomen is soft.      Comments: Patient reports a sensation that he has swelling in his abdomen.   Musculoskeletal:      Cervical back: Normal range of motion.      Right lower le+ Pitting Edema present.      Left lower le+ Pitting Edema present.   Skin:     General: Skin is warm and dry.      Capillary Refill: Capillary refill takes less than 2 seconds.      Findings: Lesion present.      Comments: Patient has a fluid-filled bullae of approximately 4 cm circumferential on the left lateral lower leg.   Neurological:      General: No focal deficit present.      Mental Status: He is alert and oriented to person, place, and time.   Psychiatric:         Mood and Affect: Mood normal.         Behavior: Behavior normal.         Procedures           ED Course  ED Course as of 24   Sat  Patient initially evaluated in the ED pit area.  He ambulates without difficulty. []    Cardiac enzymes, serum chemistry and CBC without actionable abnormality.  Urinalysis likewise.  Will reevaluate the patient and plan disposition. []    Reevaluated the patient.  Requested that he obtain a record of his home medications.  He is calling his son at home to send a photo snap of all of the medication bottles to nursing to for reconciliation. []    Patient's home medicines are now being reconciled.  We have discussed his dependent edema, reducing sodium intake and his highly preserved food diet.  He endorses that he will try to eat grilled chicken, and reduce the processed food intake.  He will follow-up with primary care provider if we refer him to one. []      ED Course User Index  [] Jose Alejandro Adan, JOSE ARMANDO                                             Medical Decision Making  Given the patient's complaints,  reported weight gain and examination appearance, differential diagnosis includes dependent edema, cannot exclude acute kidney injury, chronic kidney disease, congestive heart failure, medication noncompliance or adverse effect.  Patient will have serum screening labs, twelve-lead ECG, plain film imaging the chest, urinalysis.  Results to be communicated disposition considered.  Patient is agreeable with this plan.    Amount and/or Complexity of Data Reviewed  Labs: ordered.  Radiology: ordered.    Risk  Prescription drug management.        Final diagnoses:   Dependent edema   Elevated blood pressure reading       ED Disposition  ED Disposition       ED Disposition   Discharge    Condition   Stable    Comment   --               Jarett Gonzales MD  [address] [facility] [address]  [phone]          PATIENT CONNECTION - [place] [id number]  [phone]        Wes Coon,   [address]  [place] [id number]  [phone]               Medication List      No changes were made to your prescriptions during this visit.            Jose Alejandro Adan, APRN  07/06/24 5714

## 2024-07-06 NOTE — DISCHARGE INSTRUCTIONS
I referred you to the internal medicine clinic, Dr. Coon.  Please call them at the information provided, request the first available appointment to establish care.  Also used the patient connection phone number listed.

## 2024-07-06 NOTE — Clinical Note
[facility] EMERGENCY DEPARTMENT  [address]  [facility] [address]  Phone: [phone]    Freeman Jean was seen and treated in our emergency department on 7/6/2024.  He may return to work on 07/09/2024.         Thank you for choosing [facility].    Igor Bueno MD

## 2024-07-15 ENCOUNTER — OFFICE VISIT (OUTPATIENT)
Dept: FAMILY MEDICINE CLINIC | Facility: CLINIC | Age: 59
End: 2024-07-15
Payer: MEDICAID

## 2024-07-15 VITALS
WEIGHT: 315 LBS | SYSTOLIC BLOOD PRESSURE: 144 MMHG | HEART RATE: 92 BPM | HEIGHT: 72 IN | DIASTOLIC BLOOD PRESSURE: 84 MMHG | BODY MASS INDEX: 42.66 KG/M2 | OXYGEN SATURATION: 95 %

## 2024-07-15 DIAGNOSIS — Z59.86 FINANCIAL INSECURITY: ICD-10-CM

## 2024-07-15 DIAGNOSIS — I10 ESSENTIAL HYPERTENSION: ICD-10-CM

## 2024-07-15 DIAGNOSIS — R06.01 ORTHOPNEA: ICD-10-CM

## 2024-07-15 DIAGNOSIS — Z12.11 COLON CANCER SCREENING: ICD-10-CM

## 2024-07-15 DIAGNOSIS — L97.929 VENOUS ULCER OF LEFT LEG: Primary | ICD-10-CM

## 2024-07-15 DIAGNOSIS — I83.029 VENOUS ULCER OF LEFT LEG: Primary | ICD-10-CM

## 2024-07-15 DIAGNOSIS — E66.01 CLASS 3 SEVERE OBESITY DUE TO EXCESS CALORIES WITH SERIOUS COMORBIDITY AND BODY MASS INDEX (BMI) OF 45.0 TO 49.9 IN ADULT: ICD-10-CM

## 2024-07-15 PROBLEM — E66.813 CLASS 3 SEVERE OBESITY DUE TO EXCESS CALORIES WITH SERIOUS COMORBIDITY AND BODY MASS INDEX (BMI) OF 45.0 TO 49.9 IN ADULT: Status: ACTIVE | Noted: 2024-07-15

## 2024-07-15 PROBLEM — M19.90 OSTEOARTHRITIS: Status: ACTIVE | Noted: 2021-03-05

## 2024-07-15 PROBLEM — G47.33 OBSTRUCTIVE SLEEP APNEA SYNDROME: Status: ACTIVE | Noted: 2021-03-05

## 2024-07-15 PROCEDURE — 3077F SYST BP >= 140 MM HG: CPT | Performed by: FAMILY MEDICINE

## 2024-07-15 PROCEDURE — 1160F RVW MEDS BY RX/DR IN RCRD: CPT | Performed by: FAMILY MEDICINE

## 2024-07-15 PROCEDURE — 1159F MED LIST DOCD IN RCRD: CPT | Performed by: FAMILY MEDICINE

## 2024-07-15 PROCEDURE — 3079F DIAST BP 80-89 MM HG: CPT | Performed by: FAMILY MEDICINE

## 2024-07-15 PROCEDURE — 99204 OFFICE O/P NEW MOD 45 MIN: CPT | Performed by: FAMILY MEDICINE

## 2024-07-15 RX ORDER — METHOTREXATE 2.5 MG/1
20 TABLET ORAL
COMMUNITY
Start: 2024-04-22

## 2024-07-15 RX ORDER — FUROSEMIDE 80 MG
80 TABLET ORAL 2 TIMES DAILY
Qty: 60 TABLET | Refills: 5 | Status: SHIPPED | OUTPATIENT
Start: 2024-07-15

## 2024-07-15 RX ORDER — FOLIC ACID 1 MG/1
1 TABLET ORAL DAILY
COMMUNITY
Start: 2024-07-01

## 2024-07-15 SDOH — ECONOMIC STABILITY - INCOME SECURITY: FINANCIAL INSECURITY: Z59.86

## 2024-07-15 NOTE — ASSESSMENT & PLAN NOTE
Likely secondary to fluid volume overload.  Recent chest x-ray reviewed, will obtain record of past EKG completed at [facility] last week.  At this time we will increase diuretic to Lasix 80 mg twice daily.  Patient is well with cardiology to establish care tomorrow.  Will likely need echocardiogram.

## 2024-07-15 NOTE — ASSESSMENT & PLAN NOTE
Patient's (Body mass index is 47.51 kg/m².) indicates that they are morbidly/severely obese (BMI > 40 or > 35 with obesity - related health condition) with health conditions that include hypertension, lower extremity venous stasis disease, and peripheral vascular disease . Weight is newly identified. BMI  is above average; BMI management plan is completed. We discussed portion control, increasing exercise, and Information on healthy weight added to patient's after visit summary.

## 2024-07-15 NOTE — PROGRESS NOTES
New Patient Office Visit      Date: 07/15/2024   Patient Name: Freeman Jean  : 1965   MRN: [id number]     Chief Complaint:    Chief Complaint   Patient presents with    Blister     Pt states he had a blister on his left leg that popped Thursday and he states it has been draining fluid     Edema     2 months     Shortness of Breath     Pt states when he walks awhile he gets SOB and when he sits down his lower back gets tight       History of Present Illness: Freeman Jean is a 58 y.o. male who is here today to establish care.  He currently lives at home with disabled individual and his son.  He notes he is currently unemployed and is working towards getting his disability.  He notes that he has a disability hearing soon.  He is originally from [facility].  Patient notes that much of his previous primary care was at [facility].    Patient has a longstanding history of hypertension.  He is currently prescribed valsartan, hydralazine, and clonidine.  Patient also was prescribed Lasix 40 mg twice daily for peripheral edema.  He notes that has been sometime since he really followed with a cardiologist.  He states that over the past 2 months he has had a significant development of peripheral edema.  He notes that he has gained 60 pounds in fluid.  He was initiated on the Lasix, but he states it is not working to help with the fluid.  He has been to the ER twice in the past week with minimal resolution.  He notes that he had an EKG completed at [facility] but this is not available in the chart at this time.  He denies any chest pain or palpitations.  He does note that when he lays flat he feels short of breath and feels that it may be fluid on his lungs.  He denies any recent illnesses.  He does note that much of the symptoms came on suddenly.  He does not believe he has recently had an echocardiogram.  Patient does state that due to the great amount of fluid he is weeping from his  bilateral lower extremities.  He states that around a week ago he had a fluid-filled bullae on his left leg and just yesterday it busted open.  He has not dressed this as he was leaving open to air.  He denies any redness or drainage from this wound.  Patient does have a history of sleep apnea.    Patient does have a previous history of rheumatoid arthritis and recently followed with his rheumatologist.  He is currently prescribed Actemra, methotrexate, and prednisone.  Patient notes that his father had cancer.  He is unsure what type.    Patient notes that he has recently lost his employment and has been having difficulty financially.  He would be interested in meeting with someone to further discuss this.      Subjective      Review of Systems:   Review of Systems   Respiratory:  Positive for shortness of breath.    Cardiovascular:  Positive for leg swelling.   All other systems reviewed and are negative.      Past Medical History:   Past Medical History:   Diagnosis Date    Arthritis     Hypertension        Past Surgical History: History reviewed. No pertinent surgical history.    Family History: History reviewed. No pertinent family history.    Social History:   Social History     Socioeconomic History    Marital status: Single   Tobacco Use    Smoking status: Never    Smokeless tobacco: Current     Types: Chew   Vaping Use    Vaping status: Never Used   Substance and Sexual Activity    Alcohol use: Yes     Comment: socially    Drug use: No    Sexual activity: Defer       Medications:     Current Outpatient Medications:     cloNIDine (CATAPRES) 0.2 MG tablet, Take 1 tablet by mouth 2 (Two) Times a Day for 30 days., Disp: 60 tablet, Rfl: 0    folic acid (FOLVITE) 1 MG tablet, Take 1 tablet by mouth Daily., Disp: , Rfl:     hydrALAZINE (APRESOLINE) 50 MG tablet, Take 1 tablet by mouth 3 (Three) Times a Day for 30 days. (Patient taking differently: Take 1 tablet by mouth 2 (Two) Times a Day.), Disp: 90 tablet, Rfl:  "0    methotrexate 2.5 MG tablet, Take 8 tablets by mouth., Disp: , Rfl:     METHOTREXATE PO, Take  by mouth., Disp: , Rfl:     potassium chloride (MICRO-K) 10 MEQ CR capsule, Take 2 capsules by mouth 2 (Two) Times a Day., Disp: , Rfl:     predniSONE (DELTASONE) 10 MG tablet, 40mg PO daily for 5 days, then 20mg PO daily for 5 days, then 10mg PO daily for 5 days, Disp: 35 tablet, Rfl: 0    Tocilizumab (ACTEMRA SC), Inject  under the skin into the appropriate area as directed., Disp: , Rfl:     valsartan (DIOVAN) 160 MG tablet, Take 1 tablet by mouth Daily., Disp: , Rfl:     furosemide (Lasix) 80 MG tablet, Take 1 tablet by mouth 2 (Two) Times a Day., Disp: 60 tablet, Rfl: 5    Allergies:   No Known Allergies    Objective     Physical Exam:  Vital Signs:   Vitals:    07/15/24 1247   BP: 144/84   Pulse: 92   SpO2: 95%   Weight: (!) 159 kg (350 lb 6.4 oz)   Height: 182.9 cm (72.01\")     Body mass index is 47.51 kg/m².       Physical Exam  Vitals and nursing note reviewed.   Constitutional:       Appearance: Normal appearance. He is normal weight. He is obese.   HENT:      Head: Normocephalic and atraumatic.      Nose: Nose normal.      Mouth/Throat:      Mouth: Mucous membranes are moist.   Eyes:      Extraocular Movements: Extraocular movements intact.      Pupils: Pupils are equal, round, and reactive to light.   Cardiovascular:      Rate and Rhythm: Normal rate and regular rhythm.      Heart sounds: Normal heart sounds.   Pulmonary:      Effort: Pulmonary effort is normal.      Breath sounds: Normal breath sounds.   Abdominal:      General: Abdomen is flat. Bowel sounds are normal.      Palpations: Abdomen is soft.   Musculoskeletal:         General: Normal range of motion.      Cervical back: Normal range of motion.      Right lower leg: Edema present.      Left lower leg: Edema present.      Comments: Wound on left lower outer extremity.  Bandage dry and clean.   Skin:     General: Skin is warm and dry. "   Neurological:      General: No focal deficit present.      Mental Status: He is alert.   Psychiatric:         Mood and Affect: Mood normal.         Behavior: Behavior normal.         Thought Content: Thought content normal.         Judgment: Judgment normal.         Procedures    Results:   PHQ-9 Total Score: 0          Assessment / Plan      Assessment/Plan:   Diagnoses and all orders for this visit:    1. Venous ulcer of left leg (Primary)  Assessment & Plan:  New diagnosis.  Likely secondary to peripheral edema uncontrolled.  Nursing staff applied clean bandage to wound, called cardiology office and got first available appointment tomorrow morning.  Patient will likely need venous imaging and wound care to area.    Orders:  -     Ambulatory Referral to Cardiology    2. Essential hypertension  Assessment & Plan:  Hypertension is stable and controlled  Continue current treatment regimen.  Dietary sodium restriction.  Weight loss.  Regular aerobic exercise.  Ambulatory blood pressure monitoring.  Blood pressure will be reassessed in 1 month.      3. Class 3 severe obesity due to excess calories with serious comorbidity and body mass index (BMI) of 45.0 to 49.9 in adult  Assessment & Plan:  Patient's (Body mass index is 47.51 kg/m².) indicates that they are morbidly/severely obese (BMI > 40 or > 35 with obesity - related health condition) with health conditions that include hypertension, lower extremity venous stasis disease, and peripheral vascular disease . Weight is newly identified. BMI  is above average; BMI management plan is completed. We discussed portion control, increasing exercise, and Information on healthy weight added to patient's after visit summary.       4. Orthopnea  Assessment & Plan:  Likely secondary to fluid volume overload.  Recent chest x-ray reviewed, will obtain record of past EKG completed at [facility] last week.  At this time we will increase diuretic to Lasix 80 mg twice daily.   Patient is well with cardiology to establish care tomorrow.  Will likely need echocardiogram.    Orders:  -     furosemide (Lasix) 80 MG tablet; Take 1 tablet by mouth 2 (Two) Times a Day.  Dispense: 60 tablet; Refill: 5    5. Financial insecurity  Assessment & Plan:  Patient states he recently lost employment and is currently seeking disability but has not been approved.  He would be interested in meeting with someone to help with financial resources    Orders:  -     Ambulatory Referral to Social Care Services (Amb Case Mgmt)    6. Colon cancer screening  -     Cologuard - Stool, Per Rectum; Future         Follow Up:   Return in about 2 weeks (around 7/29/2024) for Recheck.    Andressa Hayward DO   [facility]

## 2024-07-15 NOTE — ASSESSMENT & PLAN NOTE
Patient states he recently lost employment and is currently seeking disability but has not been approved.  He would be interested in meeting with someone to help with financial resources

## 2024-07-15 NOTE — ASSESSMENT & PLAN NOTE
New diagnosis.  Likely secondary to peripheral edema uncontrolled.  Nursing staff applied clean bandage to wound, called cardiology office and got first available appointment tomorrow morning.  Patient will likely need venous imaging and wound care to area.

## 2024-07-15 NOTE — ASSESSMENT & PLAN NOTE
Hypertension is stable and controlled  Continue current treatment regimen.  Dietary sodium restriction.  Weight loss.  Regular aerobic exercise.  Ambulatory blood pressure monitoring.  Blood pressure will be reassessed in 1 month.

## 2024-07-16 ENCOUNTER — TELEPHONE (OUTPATIENT)
Dept: FAMILY MEDICINE CLINIC | Facility: CLINIC | Age: 59
End: 2024-07-16
Payer: MEDICAID

## 2024-07-16 ENCOUNTER — REFERRAL TRIAGE (OUTPATIENT)
Age: 59
End: 2024-07-16
Payer: MEDICAID

## 2024-07-16 NOTE — TELEPHONE ENCOUNTER
Caller: Jody Jean    Relationship: Self    Best call back number: [phone]     What is the best time to reach you: ANYTIME ASAP    Who are you requesting to speak with (clinical staff, provider,  specific staff member): DR. GRAVES    Do you know the name of the person who called: JODY    What was the call regarding: PATIENT IS REQUESTING A CALL BACK WOUND DRESSING IS SLIPPING OFF THE WOUND    Is it okay if the provider responds through MyChart: NO PLEASE CALL PATIENT ASAP

## 2024-07-16 NOTE — TELEPHONE ENCOUNTER
Called pt and let him know that he is able to come in and get his dressing changed , he verbalized an understanding.

## 2024-07-17 ENCOUNTER — OFFICE VISIT (OUTPATIENT)
Dept: CARDIOLOGY | Facility: CLINIC | Age: 59
End: 2024-07-17
Payer: MEDICAID

## 2024-07-17 VITALS
BODY MASS INDEX: 42.66 KG/M2 | DIASTOLIC BLOOD PRESSURE: 82 MMHG | OXYGEN SATURATION: 96 % | WEIGHT: 315 LBS | SYSTOLIC BLOOD PRESSURE: 146 MMHG | HEIGHT: 72 IN | HEART RATE: 79 BPM

## 2024-07-17 DIAGNOSIS — L03.90 WOUND CELLULITIS: ICD-10-CM

## 2024-07-17 DIAGNOSIS — R06.02 SOB (SHORTNESS OF BREATH): Primary | ICD-10-CM

## 2024-07-17 PROCEDURE — 3079F DIAST BP 80-89 MM HG: CPT | Performed by: PHYSICIAN ASSISTANT

## 2024-07-17 PROCEDURE — 3077F SYST BP >= 140 MM HG: CPT | Performed by: PHYSICIAN ASSISTANT

## 2024-07-17 PROCEDURE — 93000 ELECTROCARDIOGRAM COMPLETE: CPT | Performed by: PHYSICIAN ASSISTANT

## 2024-07-17 PROCEDURE — 99243 OFF/OP CNSLTJ NEW/EST LOW 30: CPT | Performed by: PHYSICIAN ASSISTANT

## 2024-07-17 RX ORDER — SACUBITRIL AND VALSARTAN 24; 26 MG/1; MG/1
1 TABLET, FILM COATED ORAL 2 TIMES DAILY
Qty: 60 TABLET | Refills: 6 | Status: SHIPPED | OUTPATIENT
Start: 2024-07-17

## 2024-07-17 RX ORDER — HYDRALAZINE HYDROCHLORIDE 50 MG/1
50 TABLET, FILM COATED ORAL 3 TIMES DAILY
Qty: 90 TABLET | Refills: 0 | Status: SHIPPED | OUTPATIENT
Start: 2024-07-17 | End: 2024-08-16

## 2024-07-17 NOTE — PROGRESS NOTES
"[facility]  INITIAL OFFICE CONSULT      Freeman Jean  1965  PCP: Andressa Hayward DO    SUBJECTIVE:   Freeman Jean is a 58 y.o. male seen for a consultation visit regarding the following:     Chief Complaint:   Chief Complaint   Patient presents with    Establish Care    Congestive Heart Failure    Hypertension          Consultation is requested by Jb Iyer MD for evaluation of Establish Care, Congestive Heart Failure, and Hypertension        History:  58-year-old female lives at home with disabled individual and his son.  Patient is unemployed.  Patient is working on getting disability.  She is originally from [facility].  She presents as referral from her primary care provider regarding shortness of breath, lower extreme edema hypertension.  Patient has longstanding history of hypertension on multiple medications.  She reports a history of following with a cardiologist remotely has not seen one for several years.  She states over the past few years she has gained up to 60 pounds that she thinks most of this is \"fluid\".  She has been on Lasix therapy.  She has been in the ER on 2 different occasions for lower extreme edema most recently in the ER at [facility].  She denies chest pain or tachypalpitations.  States her blood pressure is well-controlled.      Cardiac PMH: (Old records have been reviewed and summarized below)  Congestive heart failure  Hypertension  Rheumatoid arthritis, followed by rheumatologist  Chronic venous insufficiency  Super morbid obesity, BMI is over 47 (patient was 278 pounds spring 2024 she is now currently 344 pounds)  Lower extremity blisters, bullae  Iron deficiency anemia        Past Medical History, Past Surgical History, Family history, Social History, and Medications were all reviewed with the patient today and updated as necessary.     Current Outpatient Medications   Medication Sig Dispense Refill    " folic acid (FOLVITE) 1 MG tablet Take 1 tablet by mouth Daily.      furosemide (Lasix) 80 MG tablet Take 1 tablet by mouth 2 (Two) Times a Day. 60 tablet 5    methotrexate 2.5 MG tablet Take 8 tablets by mouth.      potassium chloride (MICRO-K) 10 MEQ CR capsule Take 2 capsules by mouth 2 (Two) Times a Day.      predniSONE (DELTASONE) 10 MG tablet 40mg PO daily for 5 days, then  20mg PO daily for 5 days, then  10mg PO daily for 5 days 35 tablet 0    Tocilizumab (ACTEMRA SC) Inject  under the skin into the appropriate area as directed.      valsartan (DIOVAN) 160 MG tablet Take 1 tablet by mouth Daily.      cloNIDine (CATAPRES) 0.2 MG tablet Take 1 tablet by mouth 2 (Two) Times a Day for 30 days. 60 tablet 0    hydrALAZINE (APRESOLINE) 50 MG tablet Take 1 tablet by mouth 3 (Three) Times a Day for 30 days. (Patient taking differently: Take 1 tablet by mouth 2 (Two) Times a Day.) 90 tablet 0    METHOTREXATE PO Take  by mouth. (Patient not taking: Reported on 7/17/2024)       No current facility-administered medications for this visit.     No Known Allergies      Past Medical History:   Diagnosis Date    Arthritis     Hypertension      History reviewed. No pertinent surgical history.  History reviewed. No pertinent family history.  Social History     Tobacco Use    Smoking status: Never    Smokeless tobacco: Current     Types: Chew   Substance Use Topics    Alcohol use: Yes     Comment: socially       ROS:  Review of Symptoms:  General: 100 pound weight gain past few months fatigue  Skin:  open wound left lower extremity, blisters from severe edema  HEENT: no dizziness, lightheadedness, or vision changes  Respiratory: no cough or hemoptysis  Cardiovascular: no palpitations, and tachycardia  Gastrointestinal: no black/tarry stools or diarrhea  Urinary: no change in frequency or urgency  Peripheral Vascular: no claudication or leg cramps, chronic venous insufficiency  Musculoskeletal: Rheumatoid arthritis  Psychiatric: no  "depression or excessive stress  Neurological: no sensory or motor loss, no syncope  Hematologic: no anemia, easy bruising or bleeding  Endocrine: no thyroid problems, nor heat or cold intolerance         PHYSICAL EXAM:   /82 (BP Location: Right arm, Patient Position: Sitting, Cuff Size: Adult)   Pulse 79   Ht 182.9 cm (72.01\")   Wt (!) 156 kg (344 lb)   SpO2 96%   BMI 46.64 kg/m²      Wt Readings from Last 5 Encounters:   07/17/24 (!) 156 kg (344 lb)   07/15/24 (!) 159 kg (350 lb 6.4 oz)   07/06/24 (!) 156 kg (344 lb)   09/21/22 (!) 150 kg (330 lb)   10/27/21 (!) 150 kg (330 lb)     BP Readings from Last 5 Encounters:   07/17/24 146/82   07/15/24 144/84   07/06/24 164/71   09/21/22 (!) 201/105   10/28/21 (!) 199/105       General-Well Nourished, Well developed  Eyes - PERRLA  Neck- supple, No mass  CV- regular rate and rhythm, no MRG  Lung- clear bilaterally  Abd- soft, +BS  Musc/skel - Norm strength and range of motion, 3+ edema left lower extremity open wound  Skin- warm and dry  Neuro - Alert & Oriented x 3, appropriate mood.    Patient's external notes were reviewed.  Independent interpretation of test performed by another physician in facility were reviewed.  Outside laboratory data was also reviewed.    Medical problems and test results were reviewed with the patient today.     Results for orders placed or performed during the hospital encounter of 07/06/24   Single High Sensitivity Troponin T    Specimen: Blood   Result Value Ref Range    HS Troponin T 12 <22 ng/L   BNP    Specimen: Blood   Result Value Ref Range    proBNP <36.0 0.0 - 900.0 pg/mL   CBC Auto Differential    Specimen: Blood   Result Value Ref Range    WBC 3.58 3.40 - 10.80 10*3/mm3    RBC 4.83 4.14 - 5.80 10*6/mm3    Hemoglobin 11.5 (L) 13.0 - 17.7 g/dL    Hematocrit 37.9 37.5 - 51.0 %    MCV 78.5 (L) 79.0 - 97.0 fL    MCH 23.8 (L) 26.6 - 33.0 pg    MCHC 30.3 (L) 31.5 - 35.7 g/dL    RDW 18.0 (H) 12.3 - 15.4 %    RDW-SD 49.0 37.0 - " 54.0 fl    MPV 11.8 6.0 - 12.0 fL    Platelets 157 140 - 450 10*3/mm3    Neutrophil % 51.9 42.7 - 76.0 %    Lymphocyte % 31.3 19.6 - 45.3 %    Monocyte % 8.1 5.0 - 12.0 %    Eosinophil % 7.8 (H) 0.3 - 6.2 %    Basophil % 0.6 0.0 - 1.5 %    Immature Grans % 0.3 0.0 - 0.5 %    Neutrophils, Absolute 1.86 1.70 - 7.00 10*3/mm3    Lymphocytes, Absolute 1.12 0.70 - 3.10 10*3/mm3    Monocytes, Absolute 0.29 0.10 - 0.90 10*3/mm3    Eosinophils, Absolute 0.28 0.00 - 0.40 10*3/mm3    Basophils, Absolute 0.02 0.00 - 0.20 10*3/mm3    Immature Grans, Absolute 0.01 0.00 - 0.05 10*3/mm3    nRBC 0.0 0.0 - 0.2 /100 WBC   Comprehensive Metabolic Panel    Specimen: Blood   Result Value Ref Range    Glucose 94 65 - 99 mg/dL    BUN 17 6 - 20 mg/dL    Creatinine 0.89 0.76 - 1.27 mg/dL    Sodium 143 136 - 145 mmol/L    Potassium 4.3 3.5 - 5.2 mmol/L    Chloride 108 (H) 98 - 107 mmol/L    CO2 28.0 22.0 - 29.0 mmol/L    Calcium 8.4 (L) 8.6 - 10.5 mg/dL    Total Protein 6.2 6.0 - 8.5 g/dL    Albumin 3.7 3.5 - 5.2 g/dL    ALT (SGPT) 39 1 - 41 U/L    AST (SGOT) 25 1 - 40 U/L    Alkaline Phosphatase 68 39 - 117 U/L    Total Bilirubin 0.8 0.0 - 1.2 mg/dL    Globulin 2.5 gm/dL    A/G Ratio 1.5 g/dL    BUN/Creatinine Ratio 19.1 7.0 - 25.0    Anion Gap 7.0 5.0 - 15.0 mmol/L    eGFR 98.7 >60.0 mL/min/1.73   Lipase    Specimen: Blood   Result Value Ref Range    Lipase 33 13 - 60 U/L   Urinalysis With Microscopic If Indicated (No Culture) - Urine, Clean Catch    Specimen: Urine, Clean Catch   Result Value Ref Range    Color, UA Dark Yellow (A) Yellow, Straw    Appearance, UA Cloudy (A) Clear    pH, UA 5.5 5.0 - 8.0    Specific Gravity, UA 1.027 1.001 - 1.030    Glucose, UA Negative Negative    Ketones, UA Trace (A) Negative    Bilirubin, UA Negative Negative    Blood, UA Negative Negative    Protein, UA Trace (A) Negative    Leuk Esterase, UA Negative Negative    Nitrite, UA Negative Negative    Urobilinogen, UA 1.0 E.U./dL 0.2 - 1.0 E.U./dL  "  Magnesium    Specimen: Blood   Result Value Ref Range    Magnesium 1.9 1.6 - 2.6 mg/dL   Protime-INR    Specimen: Blood   Result Value Ref Range    Protime 14.6 (H) 12.2 - 14.5 Seconds    INR 1.13 (H) 0.89 - 1.12   Urinalysis, Microscopic Only - Urine, Clean Catch    Specimen: Urine, Clean Catch   Result Value Ref Range    RBC, UA 0-2 None Seen, 0-2 /HPF    WBC, UA 3-5 (A) None Seen, 0-2 /HPF    Bacteria, UA None Seen None Seen, Trace /HPF    Squamous Epithelial Cells, UA 0-2 None Seen, 0-2 /HPF    Hyaline Casts, UA 13-20 0 - 6 /LPF    Methodology Automated Microscopy          No results found for: \"CHOL\", \"HDL\", \"HDLC\", \"LDL\", \"LDLC\", \"VLDL\"    EKG:  (EKG/Tracing has been independently visualized by me and summarized below)      ECG 12 Lead    Date/Time: 7/17/2024 4:05 PM  Performed by: Rajeev Palmer PA    Authorized by: Rajeev Palmer PA  Comparison: compared with previous ECG from 7/20/2021  Similar to previous ECG  Rhythm: sinus rhythm and sinus tachycardia  Rate: tachycardic  QRS axis: normal  Other findings: non-specific ST-T wave changes    Clinical impression: non-specific ECG          ASSESSMENT   1.  Probable diastolic heart failure due to super morbid obesity, hypertension    2.  Hypertension: Controlled current medical therapy    3.  Venous insufficiency, chronic lower extreme edema, lower extremity left leg wood     4.  Super morbid obesity, BMI 46    5.  Alcohol use, discussed need for cessation      PLAN  Echocardiogram  Diovan today, add Entresto 24/26 1 tab p.o. twice daily in 48 hours  Increase hydralazine 50 mg 3 times daily  To Lasix therapy  Elevate legs, wound care with PT therapy  Eventual plan for reducing clonidine and stopping clonidine due to side effect, consider adding beta-blocker  Patient must modify diet, reduce sodium, alcohol Daily weights monitoring and fluid intake monitoring  Short-term follow-up 2 months          Cardiology/Electrophysiology  07/17/24  09:07 " EDT  Electronically signed by DENNISE Wong, 07/16/24, 8:33 AM EDT.

## 2024-07-18 ENCOUNTER — PATIENT OUTREACH (OUTPATIENT)
Age: 59
End: 2024-07-18
Payer: MEDICAID

## 2024-07-18 NOTE — OUTREACH NOTE
Social Work Assessment  Questions/Answers      Flowsheet Row Most Recent Value   Referral Source physician   Reason for Consult community resources   Preferred Language English   Advance Care Planning Reviewed no concerns identified   People in Home alone   Current Living Arrangements apartment   Potentially Unsafe Housing Conditions none   In the past 12 months has the electric, gas, oil, or water company threatened to shut off services in your home? No   Primary Care Provided by self   Provides Primary Care For no one   Source of Income social security   Medications independent   Meal Preparation independent   Housekeeping independent   Laundry independent        SDOH updated and reviewed with the patient during this program:  Financial Resource Strain: High Risk (7/18/2024)    Overall Financial Resource Strain (CARDIA)     Difficulty of Paying Living Expenses: Very hard      --      Received from [facility] [employer] [facility]    Food Insecurity      --     Housing Stability: High Risk (7/18/2024)    Housing Stability Vital Sign     Unable to Pay for Housing in the Last Year: Yes     Number of Times Moved in the Last Year: 1     Homeless in the Last Year: No      --     Transportation Needs: No Transportation Needs (7/18/2024)    PRAPARE - Transportation     Lack of Transportation (Medical): No     Lack of Transportation (Non-Medical): No      --     Utilities: At Risk (7/18/2024)    [facility] Utilities     Threatened with loss of utilities: Yes   Patient Outreach    FRANCISCO contacted pt after receiving a referral from provider. Pt has not been able to work. He's applied for disability and been denied twice. He is behind on his rent. FRANCISCO offered a list of agencies that may be able to assist, but pt isn't able to write them down. He states he will call FRANCISCO back.    Abi SANTIAGO -   Ambulatory Case Management    7/18/2024, 14:37 EDT

## 2024-07-25 ENCOUNTER — OFFICE VISIT (OUTPATIENT)
Dept: FAMILY MEDICINE CLINIC | Facility: CLINIC | Age: 59
End: 2024-07-25
Payer: MEDICAID

## 2024-07-25 VITALS
WEIGHT: 315 LBS | HEART RATE: 73 BPM | BODY MASS INDEX: 42.66 KG/M2 | OXYGEN SATURATION: 98 % | DIASTOLIC BLOOD PRESSURE: 74 MMHG | HEIGHT: 72 IN | SYSTOLIC BLOOD PRESSURE: 166 MMHG

## 2024-07-25 DIAGNOSIS — R06.01 ORTHOPNEA: ICD-10-CM

## 2024-07-25 DIAGNOSIS — I10 ESSENTIAL HYPERTENSION: ICD-10-CM

## 2024-07-25 DIAGNOSIS — I83.029 VENOUS ULCER OF LEFT LEG: Primary | ICD-10-CM

## 2024-07-25 DIAGNOSIS — L97.929 VENOUS ULCER OF LEFT LEG: Primary | ICD-10-CM

## 2024-07-25 PROCEDURE — 1160F RVW MEDS BY RX/DR IN RCRD: CPT | Performed by: FAMILY MEDICINE

## 2024-07-25 PROCEDURE — 3077F SYST BP >= 140 MM HG: CPT | Performed by: FAMILY MEDICINE

## 2024-07-25 PROCEDURE — 3078F DIAST BP <80 MM HG: CPT | Performed by: FAMILY MEDICINE

## 2024-07-25 PROCEDURE — 99214 OFFICE O/P EST MOD 30 MIN: CPT | Performed by: FAMILY MEDICINE

## 2024-07-25 PROCEDURE — 1159F MED LIST DOCD IN RCRD: CPT | Performed by: FAMILY MEDICINE

## 2024-07-25 RX ORDER — DOXYCYCLINE 100 MG/1
100 TABLET ORAL 2 TIMES DAILY
Qty: 20 TABLET | Refills: 0 | Status: SHIPPED | OUTPATIENT
Start: 2024-07-25 | End: 2024-08-04

## 2024-07-25 RX ORDER — ACETAMINOPHEN 500 MG
500 TABLET ORAL EVERY 6 HOURS PRN
Qty: 120 TABLET | Refills: 5 | Status: SHIPPED | OUTPATIENT
Start: 2024-07-25

## 2024-07-25 RX ORDER — SULFAMETHOXAZOLE AND TRIMETHOPRIM 800; 160 MG/1; MG/1
1 TABLET ORAL 2 TIMES DAILY
Qty: 14 TABLET | Refills: 0 | Status: CANCELLED | OUTPATIENT
Start: 2024-07-25 | End: 2024-08-01

## 2024-07-25 NOTE — ASSESSMENT & PLAN NOTE
Hypertension is uncontrolled  Continue current treatment regimen.  Weight loss.  Regular aerobic exercise.  Ambulatory blood pressure monitoring.  Referral to cardiology  ECHO pending  Blood pressure will be reassessedin 4 weeks.

## 2024-07-25 NOTE — ASSESSMENT & PLAN NOTE
Per cardio probable diastolic heart failure.  Patient has lost significant weight since last visit with increase of Lasix.  Continue Lasix 80 twice daily and will repeat laboratory evaluation next visit.

## 2024-07-25 NOTE — ASSESSMENT & PLAN NOTE
Due to increase in drainage from leg, will start both oral and topical antibiotic.  Patient provided with wound care supplies.  Will place new referral to wound care excepted by his insurance.

## 2024-07-25 NOTE — PROGRESS NOTES
Office Note     Name: Freeman Jean    : 1965     MRN: [id number]     Chief Complaint  Skin Ulcer (2 week follow , he states that it is still draining and is still painful ) and Shortness of Breath (Still having some SOB)    Subjective     History of Present Illness:  Freeman Jean is a 58 y.o. male who presents today for follow-up.    Patient has a diagnosis of probable diastolic heart failure secondary to morbid obesity, orthopnea, and left venous stasis ulcer.  Patient recently established with cardiology who ordered an echocardiogram and began him on Entresto.  He was referred by cardiology to [facility] for wound care, however they do not accept his insurance.  He states that he was instructed to leave his wound open to air and to return to me for referral to wound care.  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  He is requesting wound care supplies today.  Patient has been taking his Lasix 80 mg twice daily and is down in weight since last visit.    Review of Systems:   Review of Systems   Cardiovascular:  Positive for leg swelling.   Skin:  Positive for wound.   All other systems reviewed and are negative.      Past Medical History:   Past Medical History:   Diagnosis Date   • Arthritis    • Hypertension        Past Surgical History: History reviewed. No pertinent surgical history.    Family History: History reviewed. No pertinent family history.    Social History:   Social History     Socioeconomic History   • Marital status: Single   Tobacco Use   • Smoking status: Never   • Smokeless tobacco: Current     Types: Chew   Vaping Use   • Vaping status: Never Used   Substance and Sexual Activity   • Alcohol use: Yes     Comment: socially   • Drug use: No   • Sexual activity: Defer       Immunizations:   Immunization History   Administered Date(s) Administered   •  "COVID-19 (PFIZER) Purple Cap Monovalent 02/24/2021        Medications:     Current Outpatient Medications:   •  folic acid (FOLVITE) 1 MG tablet, Take 1 tablet by mouth Daily., Disp: , Rfl:   •  furosemide (Lasix) 80 MG tablet, Take 1 tablet by mouth 2 (Two) Times a Day., Disp: 60 tablet, Rfl: 5  •  hydrALAZINE (APRESOLINE) 50 MG tablet, Take 1 tablet by mouth 3 (Three) Times a Day for 30 days., Disp: 90 tablet, Rfl: 0  •  methotrexate 2.5 MG tablet, Take 8 tablets by mouth., Disp: , Rfl:   •  METHOTREXATE PO, Take  by mouth., Disp: , Rfl:   •  potassium chloride (MICRO-K) 10 MEQ CR capsule, Take 2 capsules by mouth 2 (Two) Times a Day., Disp: , Rfl:   •  predniSONE (DELTASONE) 10 MG tablet, 40mg PO daily for 5 days, then 20mg PO daily for 5 days, then 10mg PO daily for 5 days, Disp: 35 tablet, Rfl: 0  •  sacubitril-valsartan (Entresto) 24-26 MG tablet, Take 1 tablet by mouth 2 (Two) Times a Day. Start 7/20/2024, Disp: 60 tablet, Rfl: 6  •  Tocilizumab (ACTEMRA SC), Inject  under the skin into the appropriate area as directed., Disp: , Rfl:   •  acetaminophen (TYLENOL) 500 MG tablet, Take 1 tablet by mouth Every 6 (Six) Hours As Needed for Mild Pain., Disp: 120 tablet, Rfl: 5  •  cloNIDine (CATAPRES) 0.2 MG tablet, Take 1 tablet by mouth 2 (Two) Times a Day for 30 days., Disp: 60 tablet, Rfl: 0  •  doxycycline (ADOXA) 100 MG tablet, Take 1 tablet by mouth 2 (Two) Times a Day for 10 days., Disp: 20 tablet, Rfl: 0  •  mupirocin (BACTROBAN) 2 % ointment, Apply 1 g topically daily to wound for 14 days or until healed, Disp: 14 g, Rfl: 1    Allergies:   No Known Allergies    Objective     Vital Signs  /74   Pulse 73   Ht 182.9 cm (72.01\")   Wt (!) 155 kg (342 lb 6.4 oz)   SpO2 98%   BMI 46.43 kg/m²   Estimated body mass index is 46.43 kg/m² as calculated from the following:    Height as of this encounter: 182.9 cm (72.01\").    Weight as of this encounter: 155 kg (342 lb 6.4 oz).         Physical Exam  Vitals " and nursing note reviewed.   Constitutional:       Appearance: Normal appearance. He is obese.   HENT:      Head: Normocephalic and atraumatic.      Nose: Nose normal.      Mouth/Throat:      Mouth: Mucous membranes are moist.   Eyes:      Extraocular Movements: Extraocular movements intact.      Pupils: Pupils are equal, round, and reactive to light.   Cardiovascular:      Rate and Rhythm: Normal rate and regular rhythm.      Heart sounds: Normal heart sounds.   Pulmonary:      Effort: Pulmonary effort is normal.      Breath sounds: Normal breath sounds.   Abdominal:      General: Abdomen is flat. Bowel sounds are normal.      Palpations: Abdomen is soft.   Musculoskeletal:         General: Normal range of motion.      Cervical back: Normal range of motion.   Skin:     General: Skin is warm and dry.      Comments: Large 4 inch wound on left lateral lower extremity, yellow drainage from wound.  Patient reports it is extremely tender.   Neurological:      General: No focal deficit present.      Mental Status: He is alert.   Psychiatric:         Mood and Affect: Mood normal.         Behavior: Behavior normal.         Thought Content: Thought content normal.         Judgment: Judgment normal.            Procedures     Results:  No results found for this or any previous visit (from the past 24 hour(s)).     Assessment and Plan     Assessment/Plan:  Diagnoses and all orders for this visit:    1. Venous ulcer of left leg (Primary)  Assessment & Plan:  Due to increase in drainage from leg, will start both oral and topical antibiotic.  Patient provided with wound care supplies.  Will place new referral to wound care excepted by his insurance.    Orders:  -     acetaminophen (TYLENOL) 500 MG tablet; Take 1 tablet by mouth Every 6 (Six) Hours As Needed for Mild Pain.  Dispense: 120 tablet; Refill: 5  -     doxycycline (ADOXA) 100 MG tablet; Take 1 tablet by mouth 2 (Two) Times a Day for 10 days.  Dispense: 20 tablet; Refill:  0  -     mupirocin (BACTROBAN) 2 % ointment; Apply 1 g topically daily to wound for 14 days or until healed  Dispense: 14 g; Refill: 1  -     Ambulatory Referral to Wound Clinic    2. Essential hypertension  Assessment & Plan:  Hypertension is uncontrolled  Continue current treatment regimen.  Weight loss.  Regular aerobic exercise.  Ambulatory blood pressure monitoring.  Referral to cardiology  ECHO pending  Blood pressure will be reassessedin 4 weeks.      3. Orthopnea  Assessment & Plan:  Per cardio probable diastolic heart failure.  Patient has lost significant weight since last visit with increase of Lasix.  Continue Lasix 80 twice daily and will repeat laboratory evaluation next visit.          Follow Up  Return in about 4 weeks (around 8/22/2024) for Recheck.    Andressa Hayward DO   [facility] [place] [facility]

## 2024-08-02 ENCOUNTER — TELEPHONE (OUTPATIENT)
Dept: FAMILY MEDICINE CLINIC | Facility: CLINIC | Age: 59
End: 2024-08-02
Payer: MEDICAID

## 2024-08-02 NOTE — TELEPHONE ENCOUNTER
Patient states that his wound on his leg is burning very badly and that his medications are not helping. The wound care is on the 15th. Any further recommendations?

## 2024-08-02 NOTE — TELEPHONE ENCOUNTER
Called and discussed case with patient.  Since patient symptoms are worsening despite oral and topical antibiotics, would recommend further evaluation, repeat laboratory assessment, and likely imaging of the leg.  Also discussed with patient echocardiogram, he states that he is still waiting it to be scheduled with cardiology.  Patient notes that he plans to go to the [facility] [place] [facility] for further evaluation.

## 2024-08-02 NOTE — TELEPHONE ENCOUNTER
Caller: Freeman Jean    Relationship: Self    Best call back number: [phone]     What is the best time to reach you: ASAP    Who are you requesting to speak with (clinical staff, provider,  specific staff member): DR GRAVES OR NURSE       What was the call regarding: PATIENT CALLED NEEDING TO SPEAK TO HIS PCP OR A NURSE REGARDING THE WOUND HE HAS. PLEASE ADVISE

## 2024-08-22 ENCOUNTER — OFFICE VISIT (OUTPATIENT)
Dept: FAMILY MEDICINE CLINIC | Facility: CLINIC | Age: 59
End: 2024-08-22
Payer: MEDICAID

## 2024-08-22 VITALS
HEIGHT: 72 IN | BODY MASS INDEX: 42.66 KG/M2 | DIASTOLIC BLOOD PRESSURE: 80 MMHG | SYSTOLIC BLOOD PRESSURE: 128 MMHG | OXYGEN SATURATION: 97 % | WEIGHT: 315 LBS | HEART RATE: 70 BPM

## 2024-08-22 DIAGNOSIS — H10.13 ALLERGIC CONJUNCTIVITIS OF BOTH EYES: ICD-10-CM

## 2024-08-22 DIAGNOSIS — I50.32 DIASTOLIC CHF, CHRONIC: Primary | ICD-10-CM

## 2024-08-22 DIAGNOSIS — I83.029 VENOUS ULCER OF LEFT LEG: ICD-10-CM

## 2024-08-22 DIAGNOSIS — I89.0 LYMPHEDEMA: ICD-10-CM

## 2024-08-22 DIAGNOSIS — L97.929 VENOUS ULCER OF LEFT LEG: ICD-10-CM

## 2024-08-22 PROCEDURE — 99214 OFFICE O/P EST MOD 30 MIN: CPT | Performed by: FAMILY MEDICINE

## 2024-08-22 PROCEDURE — 1160F RVW MEDS BY RX/DR IN RCRD: CPT | Performed by: FAMILY MEDICINE

## 2024-08-22 PROCEDURE — 3079F DIAST BP 80-89 MM HG: CPT | Performed by: FAMILY MEDICINE

## 2024-08-22 PROCEDURE — 1159F MED LIST DOCD IN RCRD: CPT | Performed by: FAMILY MEDICINE

## 2024-08-22 PROCEDURE — 3074F SYST BP LT 130 MM HG: CPT | Performed by: FAMILY MEDICINE

## 2024-08-22 RX ORDER — HYDROCODONE BITARTRATE AND ACETAMINOPHEN 10; 325 MG/1; MG/1
1 TABLET ORAL EVERY 6 HOURS PRN
Qty: 12 TABLET | Refills: 0 | Status: SHIPPED | OUTPATIENT
Start: 2024-08-22

## 2024-08-22 RX ORDER — HYDROCODONE BITARTRATE AND ACETAMINOPHEN 5; 325 MG/1; MG/1
1 TABLET ORAL EVERY 12 HOURS PRN
Qty: 60 TABLET | Refills: 0 | Status: CANCELLED | OUTPATIENT
Start: 2024-08-22

## 2024-08-22 RX ORDER — OLOPATADINE HYDROCHLORIDE 2 MG/ML
1 SOLUTION/ DROPS OPHTHALMIC DAILY
Qty: 2.5 ML | Refills: 0 | Status: SHIPPED | OUTPATIENT
Start: 2024-08-22

## 2024-08-22 NOTE — ASSESSMENT & PLAN NOTE
Cardiology follow-up tomorrow.  Recent chest x-ray reviewed, will plan for echocardiogram tomorrow.

## 2024-08-22 NOTE — ASSESSMENT & PLAN NOTE
Congestive heart failure due to hypertension.  Heart failure is stable.    Continue current treatment regimen.  Dietary sodium restriction.  Encouraged daily monitoring of the patient's weight.  Regular aerobic exercise.  Continue current medications.  Refer to Cardiology.  Heart failure will be reassessed in 4 weeks.

## 2024-08-22 NOTE — PROGRESS NOTES
Office Note     Name: Freeman Jean    : 1965     MRN: [id number]     Chief Complaint  Leg Pain (Follow up with his leg) and Shortness of Breath (Still having some SOB )    Subjective     History of Present Illness:  Freeman Jean is a 59 y.o. male who presents today for follow-up on leg pain.    Patient has a diagnosis of diastolic CHF and lymphedema.  He is following with cardiology and has an appointment tomorrow.  He notes he has not had his echocardiogram and plans have this done tomorrow.  Patient is also been referred to wound care and had an appointment early this morning.  He notes that the wound is quite large and during wound changes and scrapings it becomes extremely painful.  He is requesting medicine to take as needed to help with this pain.  He notes that his medication options are limited as he is already taking Tylenol with minimal relief and NSAIDs are contraindicated due to his methotrexate.  Patient notes that he is down in weight from last check, however he notes it is difficult for him to be physically active related to his leg pain.  He notes that he has been going to the park to walk but become short of breath and will have to sit down after doing a short distance.    Review of Systems:   Review of Systems   Constitutional:  Positive for fatigue.   Respiratory:  Positive for shortness of breath.    Cardiovascular:  Positive for leg swelling.   All other systems reviewed and are negative.      Past Medical History:   Past Medical History:   Diagnosis Date    Arthritis     Hypertension        Past Surgical History: History reviewed. No pertinent surgical history.    Family History: History reviewed. No pertinent family history.    Social History:   Social History     Socioeconomic History    Marital status: Single   Tobacco Use    Smoking status: Never    Smokeless tobacco: Current     Types: Chew   Vaping Use    Vaping status: Never Used   Substance and Sexual Activity     Alcohol use: Yes     Comment: socially    Drug use: No    Sexual activity: Defer       Immunizations:   Immunization History   Administered Date(s) Administered    COVID-19 (PFIZER) Purple Cap Monovalent 02/24/2021        Medications:     Current Outpatient Medications:     acetaminophen (TYLENOL) 500 MG tablet, Take 1 tablet by mouth Every 6 (Six) Hours As Needed for Mild Pain., Disp: 120 tablet, Rfl: 5    folic acid (FOLVITE) 1 MG tablet, Take 1 tablet by mouth Daily., Disp: , Rfl:     furosemide (Lasix) 80 MG tablet, Take 1 tablet by mouth 2 (Two) Times a Day., Disp: 60 tablet, Rfl: 5    methotrexate 2.5 MG tablet, Take 8 tablets by mouth., Disp: , Rfl:     METHOTREXATE PO, Take  by mouth., Disp: , Rfl:     mupirocin (BACTROBAN) 2 % ointment, Apply 1 g topically daily to wound for 14 days or until healed, Disp: 14 g, Rfl: 1    potassium chloride (MICRO-K) 10 MEQ CR capsule, Take 2 capsules by mouth 2 (Two) Times a Day., Disp: , Rfl:     predniSONE (DELTASONE) 10 MG tablet, 40mg PO daily for 5 days, then 20mg PO daily for 5 days, then 10mg PO daily for 5 days, Disp: 35 tablet, Rfl: 0    sacubitril-valsartan (Entresto) 24-26 MG tablet, Take 1 tablet by mouth 2 (Two) Times a Day. Start 7/20/2024, Disp: 60 tablet, Rfl: 6    Tocilizumab (ACTEMRA SC), Inject  under the skin into the appropriate area as directed., Disp: , Rfl:     cloNIDine (CATAPRES) 0.2 MG tablet, Take 1 tablet by mouth 2 (Two) Times a Day for 30 days., Disp: 60 tablet, Rfl: 0    hydrALAZINE (APRESOLINE) 50 MG tablet, Take 1 tablet by mouth 3 (Three) Times a Day for 30 days., Disp: 90 tablet, Rfl: 0    HYDROcodone-acetaminophen (NORCO)  MG per tablet, Take 1 tablet by mouth Every 6 (Six) Hours As Needed for Moderate Pain., Disp: 12 tablet, Rfl: 0    olopatadine (PATADAY) 0.2 % solution ophthalmic solution, Administer 1 drop to both eyes Daily., Disp: 2.5 mL, Rfl: 0    Allergies:   No Known Allergies    Objective     Vital Signs  /80    "Pulse 70   Ht 182.9 cm (72.01\")   Wt (!) 155 kg (341 lb)   SpO2 97%   BMI 46.24 kg/m²   Estimated body mass index is 46.24 kg/m² as calculated from the following:    Height as of this encounter: 182.9 cm (72.01\").    Weight as of this encounter: 155 kg (341 lb).         Physical Exam  Vitals and nursing note reviewed.   Constitutional:       Appearance: Normal appearance. He is obese.   HENT:      Head: Normocephalic and atraumatic.      Nose: Nose normal.      Mouth/Throat:      Mouth: Mucous membranes are moist.   Eyes:      Extraocular Movements: Extraocular movements intact.      Pupils: Pupils are equal, round, and reactive to light.   Cardiovascular:      Rate and Rhythm: Normal rate and regular rhythm.      Heart sounds: Normal heart sounds.   Pulmonary:      Effort: Pulmonary effort is normal.      Breath sounds: Normal breath sounds.   Abdominal:      General: Abdomen is flat. Bowel sounds are normal.      Palpations: Abdomen is soft.   Musculoskeletal:         General: Normal range of motion.      Cervical back: Normal range of motion.      Right lower leg: No edema.      Left lower leg: Edema present.   Skin:     General: Skin is warm and dry.   Neurological:      General: No focal deficit present.      Mental Status: He is alert.   Psychiatric:         Mood and Affect: Mood normal.         Behavior: Behavior normal.         Thought Content: Thought content normal.         Judgment: Judgment normal.         Procedures     Results:  No results found for this or any previous visit (from the past 24 hour(s)).     Assessment and Plan     Assessment/Plan:  Diagnoses and all orders for this visit:    1. Diastolic CHF, chronic (Primary)  Assessment & Plan:  Congestive heart failure due to hypertension.  Heart failure is stable.    Continue current treatment regimen.  Dietary sodium restriction.  Encouraged daily monitoring of the patient's weight.  Regular aerobic exercise.  Continue current " medications.  Refer to Cardiology.  Heart failure will be reassessed in 4 weeks.          2. Lymphedema  Assessment & Plan:  Cardiology follow-up tomorrow.  Recent chest x-ray reviewed, will plan for echocardiogram tomorrow.      3. Venous ulcer of left leg  Assessment & Plan:  Continue wound care.  Dressing clean and dry at this time.  Unna boots ordered.  Will provide hydrocodone as needed for wound change.  Continue Tylenol.  Unable to tolerate NSAIDs due to methotrexate    Orders:  -     HYDROcodone-acetaminophen (NORCO)  MG per tablet; Take 1 tablet by mouth Every 6 (Six) Hours As Needed for Moderate Pain.  Dispense: 12 tablet; Refill: 0    4. Allergic conjunctivitis of both eyes  Assessment & Plan:  Will start Pataday    Orders:  -     olopatadine (PATADAY) 0.2 % solution ophthalmic solution; Administer 1 drop to both eyes Daily.  Dispense: 2.5 mL; Refill: 0        Follow Up  Return in about 4 weeks (around 9/19/2024) for Annual physical.    Andressa Hayward DO   [facility] [place] [facility]

## 2024-08-22 NOTE — ASSESSMENT & PLAN NOTE
Continue wound care.  Dressing clean and dry at this time.  Unna boots ordered.  Will provide hydrocodone as needed for wound change.  Continue Tylenol.  Unable to tolerate NSAIDs due to methotrexate

## 2024-08-23 ENCOUNTER — OFFICE VISIT (OUTPATIENT)
Dept: CARDIOLOGY | Facility: CLINIC | Age: 59
End: 2024-08-23
Payer: MEDICAID

## 2024-08-23 ENCOUNTER — PATIENT ROUNDING (BHMG ONLY) (OUTPATIENT)
Dept: CARDIOLOGY | Facility: CLINIC | Age: 59
End: 2024-08-23
Payer: MEDICAID

## 2024-08-23 VITALS
OXYGEN SATURATION: 96 % | DIASTOLIC BLOOD PRESSURE: 60 MMHG | WEIGHT: 315 LBS | BODY MASS INDEX: 42.66 KG/M2 | HEART RATE: 75 BPM | SYSTOLIC BLOOD PRESSURE: 114 MMHG | HEIGHT: 72 IN

## 2024-08-23 DIAGNOSIS — I50.32 DIASTOLIC CHF, CHRONIC: Primary | ICD-10-CM

## 2024-08-23 PROCEDURE — 99214 OFFICE O/P EST MOD 30 MIN: CPT | Performed by: PHYSICIAN ASSISTANT

## 2024-08-23 PROCEDURE — 3078F DIAST BP <80 MM HG: CPT | Performed by: PHYSICIAN ASSISTANT

## 2024-08-23 PROCEDURE — 3074F SYST BP LT 130 MM HG: CPT | Performed by: PHYSICIAN ASSISTANT

## 2024-08-23 RX ORDER — CLONIDINE HYDROCHLORIDE 0.1 MG/1
0.1 TABLET ORAL 2 TIMES DAILY
Qty: 30 TABLET | Refills: 3 | Status: SHIPPED | OUTPATIENT
Start: 2024-08-23 | End: 2024-12-21

## 2024-08-23 RX ORDER — CLONIDINE HYDROCHLORIDE 0.2 MG/1
0.1 TABLET ORAL 2 TIMES DAILY
Qty: 30 TABLET | Refills: 3 | Status: SHIPPED | OUTPATIENT
Start: 2024-08-23 | End: 2024-08-23 | Stop reason: SDUPTHER

## 2024-08-23 NOTE — PROGRESS NOTES
"August 23, 2024    Hello, may I speak with Freeman Jean? Yes.    My name is Ledy YANEZ      I am  with [facility]  [address]  [place] [address] [phone].    Before we get started may I verify your date of birth? 1965, Yes, correct.    I am calling to officially welcome you to our practice and ask about your recent visit. Is this a good time to talk? yes    Tell me about your visit with us. What things went well?  \"Everything, conversation and everything went well.\"       We're always looking for ways to make our patients' experiences even better. Do you have recommendations on ways we may improve?  no, \"No, everything was right.\"    Overall were you satisfied with your first visit to our practice? Yes, \"really was.\"       I appreciate you taking the time to speak with me today. Is there anything else I can do for you? no      Thank you, and have a great day.      "

## 2024-08-23 NOTE — PROGRESS NOTES
"[facility]   OFFICE NOTE      Freeman Jean  1965  PCP: Andressa Hayward DO    SUBJECTIVE:   Freeman Jean is a 59 y.o. male seen for a follow up visit regarding the following:     CC:CHF    HPI:   Pleasant 59-year-old gentleman presents today for lower extremity edema, shortness of breath hypertension.  He seems to make some progress since last seen by our office his primary care provider has had leg wraps on him which is working great for him he is also completing antibiotics he thinks his legs are getting better.  He still feels \"fat\" like he has a lot of fluid but he is taking his Lasix some improvement.  He had Entresto with good blood pressure control control at this point.  He denies any dizziness near syncope.  He has not obtained echocardiogram yet.    Cardiac PMH: (Old records have been reviewed and summarized below)  Congestive heart failure  Hypertension  Rheumatoid arthritis, followed by rheumatologist  Chronic venous insufficiency  Super morbid obesity, BMI is over 47 (patient was 278 pounds spring 2024  is now currently 344 pounds)  Lower extremity blisters, bullae  Iron deficiency anemia    Past Medical History, Past Surgical History, Family history, Social History, and Medications were all reviewed with the patient today and updated as necessary.       Current Outpatient Medications:     acetaminophen (TYLENOL) 500 MG tablet, Take 1 tablet by mouth Every 6 (Six) Hours As Needed for Mild Pain., Disp: 120 tablet, Rfl: 5    folic acid (FOLVITE) 1 MG tablet, Take 1 tablet by mouth Daily., Disp: , Rfl:     furosemide (Lasix) 80 MG tablet, Take 1 tablet by mouth 2 (Two) Times a Day., Disp: 60 tablet, Rfl: 5    HYDROcodone-acetaminophen (NORCO)  MG per tablet, Take 1 tablet by mouth Every 6 (Six) Hours As Needed for Moderate Pain., Disp: 12 tablet, Rfl: 0    methotrexate 2.5 MG tablet, Take 8 tablets by mouth., Disp: , Rfl:     METHOTREXATE " "PO, Take  by mouth., Disp: , Rfl:     mupirocin (BACTROBAN) 2 % ointment, Apply 1 g topically daily to wound for 14 days or until healed, Disp: 14 g, Rfl: 1    olopatadine (PATADAY) 0.2 % solution ophthalmic solution, Administer 1 drop to both eyes Daily., Disp: 2.5 mL, Rfl: 0    potassium chloride (MICRO-K) 10 MEQ CR capsule, Take 2 capsules by mouth 2 (Two) Times a Day., Disp: , Rfl:     predniSONE (DELTASONE) 10 MG tablet, 40mg PO daily for 5 days, then 20mg PO daily for 5 days, then 10mg PO daily for 5 days, Disp: 35 tablet, Rfl: 0    sacubitril-valsartan (Entresto) 24-26 MG tablet, Take 1 tablet by mouth 2 (Two) Times a Day. Start 7/20/2024, Disp: 60 tablet, Rfl: 6    Tocilizumab (ACTEMRA SC), Inject  under the skin into the appropriate area as directed., Disp: , Rfl:     cloNIDine (CATAPRES) 0.2 MG tablet, Take 1 tablet by mouth 2 (Two) Times a Day for 30 days., Disp: 60 tablet, Rfl: 0    hydrALAZINE (APRESOLINE) 50 MG tablet, Take 1 tablet by mouth 3 (Three) Times a Day for 30 days., Disp: 90 tablet, Rfl: 0      No Known Allergies      PHYSICAL EXAM:    /60   Pulse 75   Ht 182.9 cm (72\")   Wt (!) 157 kg (346 lb 3.2 oz)   SpO2 96%   BMI 46.95 kg/m²        Wt Readings from Last 5 Encounters:   08/23/24 (!) 157 kg (346 lb 3.2 oz)   08/22/24 (!) 155 kg (341 lb)   07/25/24 (!) 155 kg (342 lb 6.4 oz)   07/17/24 (!) 156 kg (344 lb)   07/15/24 (!) 159 kg (350 lb 6.4 oz)       BP Readings from Last 5 Encounters:   08/23/24 114/60   08/22/24 128/80   07/25/24 166/74   07/17/24 146/82   07/15/24 144/84       General appearance - Alert, well appearing, and in no distress   Mental status - Affect appropriate to mood.  Eyes - Sclerae anicteric,  ENMT - Hearing grossly normal bilaterally, Dental hygiene good.  Neck - Carotids upstroke normal bilaterally, no bruits, no JVD.  Resp - Clear to auscultation, no wheezes, rales or rhonchi, symmetric air entry.  Heart - Normal rate, regular rhythm, normal S1, S2, no " murmurs, rubs, clicks or gallops.  GI - Soft, nontender, nondistended, no masses or organomegaly.  Neurological - Grossly intact - normal speech, no focal findings  Musculoskeletal - No joint tenderness, deformity or swelling, no muscular tenderness noted.  Extremities - Peripheral pulses normal, no pedal edema, no clubbing or cyanosis.  Skin - Normal coloration and turgor.  Psych -  oriented to person, place, and time.    Medical problems and test results were reviewed with the patient today.     No results found for this or any previous visit (from the past 672 hour(s)).      E    ASSESSMENT   1. CHF, diastolic dysfunction. Lasix.     2. Anasarca, Lower extemity edema, diurteics, antibiotis for cellulitits    3. Super morbid obesity, 344lbs.     4. HTN: Enresto, Hydrlazine , better control, will wean off Clonidine reduce 0.1mg BID, then reduce to once a day.     PLAN  Need to get echocardiogram review LV function with trying hard to get him scheduled for this he has lots of conflicts will schedule soon as possible.  Would like to wean off clonidine due to side effects he is going to cut down to 0.1 mg twice daily eventually go to once a day for a week and then discontinue.  Entresto seems to be working well for him.  Continue diet exercise weight loss return follow-up in 2 to 3 months or sooner as needed.            8/23/2024  10:47 EDT  Electronically signed by DENNISE Wong, 08/23/24, 3:51 PM EDT.

## 2024-08-26 ENCOUNTER — HOSPITAL ENCOUNTER (OUTPATIENT)
Dept: CARDIOLOGY | Facility: HOSPITAL | Age: 59
Discharge: HOME | End: 2024-08-26
Admitting: PHYSICIAN ASSISTANT
Payer: MEDICAID

## 2024-08-26 VITALS
SYSTOLIC BLOOD PRESSURE: 175 MMHG | WEIGHT: 315 LBS | HEIGHT: 72 IN | BODY MASS INDEX: 42.66 KG/M2 | DIASTOLIC BLOOD PRESSURE: 79 MMHG

## 2024-08-26 DIAGNOSIS — R06.02 SOB (SHORTNESS OF BREATH): ICD-10-CM

## 2024-08-26 LAB
ASCENDING AORTA: 3.4 CM
BH CV ECHO MEAS - AO MAX PG: 5.4 MMHG
BH CV ECHO MEAS - AO ROOT DIAM: 3.6 CM
BH CV ECHO MEAS - AO V2 MAX: 116 CM/SEC
BH CV ECHO MEAS - IVS/LVPW: 1.07 CM
BH CV ECHO MEAS - IVSD: 1.6 CM
BH CV ECHO MEAS - LA DIMENSION: 4.3 CM
BH CV ECHO MEAS - LAT PEAK E' VEL: 7.5 CM/SEC
BH CV ECHO MEAS - LV MAX PG: 3.3 MMHG
BH CV ECHO MEAS - LV MEAN PG: 1.7 MMHG
BH CV ECHO MEAS - LV V1 MAX: 90.9 CM/SEC
BH CV ECHO MEAS - LV V1 VTI: 18.4 CM
BH CV ECHO MEAS - LVIDD: 4.7 CM
BH CV ECHO MEAS - LVIDS: 3.1 CM
BH CV ECHO MEAS - LVOT DIAM: 2.3 CM
BH CV ECHO MEAS - LVPWD: 1.5 CM
BH CV ECHO MEAS - MED PEAK E' VEL: 6.4 CM/SEC
BH CV ECHO MEAS - MV A MAX VEL: 88.4 CM/SEC
BH CV ECHO MEAS - MV E MAX VEL: 75.9 CM/SEC
BH CV ECHO MEAS - MV E/A: 0.86
BH CV ECHO MEAS - RV MAX PG: 9.2 MMHG
BH CV ECHO MEAS - RV V1 MAX: 151.5 CM/SEC
BH CV ECHO MEAS - RV V1 VTI: 27.9 CM
BH CV ECHO MEASUREMENTS AVERAGE E/E' RATIO: 10.92
BH CV XLRA - RV BASE: 2.7 CM
BH CV XLRA - RV LENGTH: 7.2 CM
BH CV XLRA - RV MID: 2.21 CM
BH CV XLRA - TDI S': 14.8 CM/SEC

## 2024-08-26 PROCEDURE — 25010000002 SULFUR HEXAFLUORIDE MICROSPH 60.7-25 MG RECONSTITUTED SUSPENSION: Performed by: PHYSICIAN ASSISTANT

## 2024-08-26 PROCEDURE — 93306 TTE W/DOPPLER COMPLETE: CPT

## 2024-08-26 PROCEDURE — 93306 TTE W/DOPPLER COMPLETE: CPT | Performed by: INTERNAL MEDICINE

## 2024-08-26 RX ADMIN — SULFUR HEXAFLUORIDE 5 ML: KIT at 14:02

## 2024-09-04 ENCOUNTER — TELEPHONE (OUTPATIENT)
Dept: CARDIOLOGY | Facility: CLINIC | Age: 59
End: 2024-09-04
Payer: MEDICAID

## 2024-09-04 NOTE — TELEPHONE ENCOUNTER
Called pt with results of recent echo per DENNISE Adams    Hear function was normal, needs to focus on weight, blood pressure and edema.    PT states he is taking meds as prescribed and doing what he can to control his swelling.  He is concerned over the swelling and fluid leaking from his legs and wants to know if there is anything else that can be done.

## 2024-09-24 RX ORDER — HYDRALAZINE HYDROCHLORIDE 50 MG/1
50 TABLET, FILM COATED ORAL 3 TIMES DAILY
Qty: 90 TABLET | Refills: 0 | Status: SHIPPED | OUTPATIENT
Start: 2024-09-24

## 2024-09-24 RX ORDER — HYDRALAZINE HYDROCHLORIDE 50 MG/1
50 TABLET, FILM COATED ORAL 3 TIMES DAILY
Qty: 90 TABLET | Refills: 0 | OUTPATIENT
Start: 2024-09-24 | End: 2024-10-24

## 2024-10-15 ENCOUNTER — OFFICE VISIT (OUTPATIENT)
Dept: FAMILY MEDICINE CLINIC | Facility: CLINIC | Age: 59
End: 2024-10-15
Payer: MEDICAID

## 2024-10-15 ENCOUNTER — LAB (OUTPATIENT)
Dept: LAB | Facility: HOSPITAL | Age: 59
End: 2024-10-15
Payer: MEDICAID

## 2024-10-15 VITALS
BODY MASS INDEX: 42.66 KG/M2 | SYSTOLIC BLOOD PRESSURE: 135 MMHG | HEIGHT: 72 IN | OXYGEN SATURATION: 99 % | DIASTOLIC BLOOD PRESSURE: 97 MMHG | WEIGHT: 315 LBS | HEART RATE: 76 BPM

## 2024-10-15 DIAGNOSIS — M06.9 RHEUMATOID ARTHRITIS, INVOLVING UNSPECIFIED SITE, UNSPECIFIED WHETHER RHEUMATOID FACTOR PRESENT: ICD-10-CM

## 2024-10-15 DIAGNOSIS — E66.813 CLASS 3 SEVERE OBESITY DUE TO EXCESS CALORIES WITH SERIOUS COMORBIDITY AND BODY MASS INDEX (BMI) OF 45.0 TO 49.9 IN ADULT: ICD-10-CM

## 2024-10-15 DIAGNOSIS — E66.01 CLASS 3 SEVERE OBESITY DUE TO EXCESS CALORIES WITH SERIOUS COMORBIDITY AND BODY MASS INDEX (BMI) OF 45.0 TO 49.9 IN ADULT: ICD-10-CM

## 2024-10-15 DIAGNOSIS — I50.32 DIASTOLIC CHF, CHRONIC: Primary | ICD-10-CM

## 2024-10-15 DIAGNOSIS — L97.929 VENOUS ULCER OF LEFT LEG: ICD-10-CM

## 2024-10-15 DIAGNOSIS — I50.32 DIASTOLIC CHF, CHRONIC: ICD-10-CM

## 2024-10-15 DIAGNOSIS — I83.029 VENOUS ULCER OF LEFT LEG: ICD-10-CM

## 2024-10-15 LAB
DEPRECATED RDW RBC AUTO: 39.3 FL (ref 37–54)
ERYTHROCYTE [DISTWIDTH] IN BLOOD BY AUTOMATED COUNT: 14.1 % (ref 12.3–15.4)
HCT VFR BLD AUTO: 41.3 % (ref 37.5–51)
HGB BLD-MCNC: 13.6 G/DL (ref 13–17.7)
MCH RBC QN AUTO: 25.7 PG (ref 26.6–33)
MCHC RBC AUTO-ENTMCNC: 32.9 G/DL (ref 31.5–35.7)
MCV RBC AUTO: 77.9 FL (ref 79–97)
PLATELET # BLD AUTO: 159 10*3/MM3 (ref 140–450)
RBC # BLD AUTO: 5.3 10*6/MM3 (ref 4.14–5.8)
WBC NRBC COR # BLD AUTO: 6.53 10*3/MM3 (ref 3.4–10.8)

## 2024-10-15 PROCEDURE — 3080F DIAST BP >= 90 MM HG: CPT | Performed by: FAMILY MEDICINE

## 2024-10-15 PROCEDURE — 1159F MED LIST DOCD IN RCRD: CPT | Performed by: FAMILY MEDICINE

## 2024-10-15 PROCEDURE — 80053 COMPREHEN METABOLIC PANEL: CPT

## 2024-10-15 PROCEDURE — 1160F RVW MEDS BY RX/DR IN RCRD: CPT | Performed by: FAMILY MEDICINE

## 2024-10-15 PROCEDURE — 85027 COMPLETE CBC AUTOMATED: CPT

## 2024-10-15 PROCEDURE — 99214 OFFICE O/P EST MOD 30 MIN: CPT | Performed by: FAMILY MEDICINE

## 2024-10-15 PROCEDURE — 80061 LIPID PANEL: CPT

## 2024-10-15 PROCEDURE — 84443 ASSAY THYROID STIM HORMONE: CPT

## 2024-10-15 PROCEDURE — 3075F SYST BP GE 130 - 139MM HG: CPT | Performed by: FAMILY MEDICINE

## 2024-10-15 PROCEDURE — 83735 ASSAY OF MAGNESIUM: CPT

## 2024-10-15 RX ORDER — AMMONIUM LACTATE 12 G/100G
1 CREAM TOPICAL AS NEEDED
COMMUNITY
Start: 2024-09-20

## 2024-10-15 RX ORDER — HYDRALAZINE HYDROCHLORIDE 50 MG/1
50 TABLET, FILM COATED ORAL 3 TIMES DAILY
Qty: 90 TABLET | Refills: 5 | Status: SHIPPED | OUTPATIENT
Start: 2024-10-15

## 2024-10-15 RX ORDER — POTASSIUM CHLORIDE 750 MG/1
20 CAPSULE, EXTENDED RELEASE ORAL 2 TIMES DAILY
Qty: 60 CAPSULE | Refills: 5 | Status: SHIPPED | OUTPATIENT
Start: 2024-10-15

## 2024-10-15 RX ORDER — FUROSEMIDE 80 MG
80 TABLET ORAL 2 TIMES DAILY
Qty: 60 TABLET | Refills: 5 | Status: SHIPPED | OUTPATIENT
Start: 2024-10-15

## 2024-10-15 NOTE — PROGRESS NOTES
Office Note     Name: Freeman Jean    : 1965     MRN: [id number]     Chief Complaint  Bloated (Stomach has been getting bigger since last week he doesn't know if it swelling or not ) and Med Refill (Needs refills)    Subjective     History of Present Illness:  Freeman Jean is a 59 y.o. male who presents today for edema. He currently lives at home with disabled individual and his son.  He notes he is currently unemployed and is working towards getting his disability.  He notes that he has a disability hearing soon.  He is originally from [facility].       Patient has a longstanding history of hypertension.  He is currently prescribed valsartan, hydralazine, and clonidine.  Patient also was prescribed Lasix 80 mg twice daily for peripheral edema.  He is following with cardiology and lymphedema clinic.  He notes that he feels his abdomen is more swollen today.  He wonders if it is related to his heart or something else.  He is due for follow-up laboratory evaluation today.     Patient does have a previous history of rheumatoid arthritis and recently followed with his rheumatologist.  He is currently prescribed Actemra, methotrexate.  He did recently discontinue the prednisone    Patient notes that his father had cancer.  He is unsure what type.    Review of Systems:   Review of Systems   Constitutional:  Positive for fatigue.   Cardiovascular:  Positive for leg swelling.   Gastrointestinal:  Positive for abdominal distention.   All other systems reviewed and are negative.      Past Medical History:   Past Medical History:   Diagnosis Date    Arthritis     Hypertension        Past Surgical History: History reviewed. No pertinent surgical history.    Family History: History reviewed. No pertinent family history.    Social History:   Social History     Socioeconomic History    Marital status: Single   Tobacco Use    Smoking status: Never    Smokeless tobacco: Current     Types: Chew    Vaping Use    Vaping status: Never Used   Substance and Sexual Activity    Alcohol use: Yes     Comment: socially    Drug use: No    Sexual activity: Defer       Immunizations:   Immunization History   Administered Date(s) Administered    COVID-19 (PFIZER) Purple Cap Monovalent 02/24/2021        Medications:     Current Outpatient Medications:     acetaminophen (TYLENOL) 500 MG tablet, Take 1 tablet by mouth Every 6 (Six) Hours As Needed for Mild Pain., Disp: 120 tablet, Rfl: 5    ammonium lactate (AMLACTIN) 12 % cream, Apply 1 Application topically to the appropriate area as directed As Needed., Disp: , Rfl:     cloNIDine (CATAPRES) 0.1 MG tablet, Take 1 tablet by mouth 2 (Two) Times a Day for 120 days., Disp: 30 tablet, Rfl: 3    folic acid (FOLVITE) 1 MG tablet, Take 1 tablet by mouth Daily., Disp: , Rfl:     furosemide (Lasix) 80 MG tablet, Take 1 tablet by mouth 2 (Two) Times a Day., Disp: 60 tablet, Rfl: 5    hydrALAZINE (APRESOLINE) 50 MG tablet, Take 1 tablet by mouth 3 (Three) Times a Day., Disp: 90 tablet, Rfl: 5    HYDROcodone-acetaminophen (NORCO)  MG per tablet, Take 1 tablet by mouth Every 6 (Six) Hours As Needed for Moderate Pain., Disp: 12 tablet, Rfl: 0    methotrexate 2.5 MG tablet, Take 8 tablets by mouth., Disp: , Rfl:     METHOTREXATE PO, Take  by mouth., Disp: , Rfl:     mupirocin (BACTROBAN) 2 % ointment, Apply 1 g topically daily to wound for 14 days or until healed, Disp: 14 g, Rfl: 1    olopatadine (PATADAY) 0.2 % solution ophthalmic solution, Administer 1 drop to both eyes Daily., Disp: 2.5 mL, Rfl: 0    potassium chloride (MICRO-K) 10 MEQ CR capsule, Take 2 capsules by mouth 2 (Two) Times a Day., Disp: 60 capsule, Rfl: 5    predniSONE (DELTASONE) 10 MG tablet, 40mg PO daily for 5 days, then 20mg PO daily for 5 days, then 10mg PO daily for 5 days, Disp: 35 tablet, Rfl: 0    sacubitril-valsartan (Entresto) 24-26 MG tablet, Take 1 tablet by mouth 2 (Two) Times a Day. Start 7/20/2024,  "Disp: 60 tablet, Rfl: 6    Tocilizumab (ACTEMRA SC), Inject  under the skin into the appropriate area as directed., Disp: , Rfl:     Allergies:   No Known Allergies    Objective     Vital Signs  /97   Pulse 76   Ht 182.9 cm (72.01\")   Wt (!) 155 kg (341 lb)   SpO2 99%   BMI 46.24 kg/m²   Estimated body mass index is 46.24 kg/m² as calculated from the following:    Height as of this encounter: 182.9 cm (72.01\").    Weight as of this encounter: 155 kg (341 lb).         Physical Exam  Vitals and nursing note reviewed.   Constitutional:       Appearance: Normal appearance. He is obese.   HENT:      Head: Normocephalic and atraumatic.      Nose: Nose normal.      Mouth/Throat:      Mouth: Mucous membranes are moist.   Eyes:      Extraocular Movements: Extraocular movements intact.      Pupils: Pupils are equal, round, and reactive to light.   Cardiovascular:      Rate and Rhythm: Normal rate.   Pulmonary:      Effort: Pulmonary effort is normal.   Abdominal:      General: Abdomen is flat.   Musculoskeletal:         General: Normal range of motion.      Cervical back: Normal range of motion.   Skin:     General: Skin is warm and dry.   Neurological:      General: No focal deficit present.      Mental Status: He is alert.   Psychiatric:         Mood and Affect: Mood normal.         Behavior: Behavior normal.         Thought Content: Thought content normal.         Judgment: Judgment normal.            Procedures     Results:  No results found for this or any previous visit (from the past 24 hours).     Assessment and Plan     Assessment/Plan:  Diagnoses and all orders for this visit:    1. Diastolic CHF, chronic (Primary)  Assessment & Plan:  Congestive heart failure due to hypertension.  Heart failure is stable.    Continue current treatment regimen.  Encouraged daily monitoring of the patient's weight.  Regular aerobic exercise.  Continue current medications.  Heart failure will be reassessed in 3 " months.        Orders:  -     furosemide (Lasix) 80 MG tablet; Take 1 tablet by mouth 2 (Two) Times a Day.  Dispense: 60 tablet; Refill: 5  -     hydrALAZINE (APRESOLINE) 50 MG tablet; Take 1 tablet by mouth 3 (Three) Times a Day.  Dispense: 90 tablet; Refill: 5  -     potassium chloride (MICRO-K) 10 MEQ CR capsule; Take 2 capsules by mouth 2 (Two) Times a Day.  Dispense: 60 capsule; Refill: 5  -     CBC (No Diff); Future  -     Lipid Panel; Future  -     Comprehensive Metabolic Panel; Future  -     TSH; Future  -     Magnesium; Future    2. Venous ulcer of left leg    3. Rheumatoid arthritis, involving unspecified site, unspecified whether rheumatoid factor present  Assessment & Plan:  Continue current medication regimen and following with rheumatology      4. Class 3 severe obesity due to excess calories with serious comorbidity and body mass index (BMI) of 45.0 to 49.9 in adult  Assessment & Plan:  Patient's (Body mass index is 46.24 kg/m².) indicates that they are  with healtmorbidly/severely obese (BMI > 40 or > 35 with obesity - related health condition)h conditions that include hypertension . Weight is unchanged. BMI  is above average; BMI management plan is completed. We discussed portion control, increasing exercise, and Information on healthy weight added to patient's after visit summary.           Follow Up  Return in about 3 months (around 1/15/2025) for Recheck.    Andressa Hayward DO   [facility]

## 2024-10-15 NOTE — ASSESSMENT & PLAN NOTE
Patient's (Body mass index is 46.24 kg/m².) indicates that they are  with healtmorbidly/severely obese (BMI > 40 or > 35 with obesity - related health condition)h conditions that include hypertension . Weight is unchanged. BMI  is above average; BMI management plan is completed. We discussed portion control, increasing exercise, and Information on healthy weight added to patient's after visit summary.

## 2024-10-15 NOTE — ASSESSMENT & PLAN NOTE
Congestive heart failure due to hypertension.  Heart failure is stable.    Continue current treatment regimen.  Encouraged daily monitoring of the patient's weight.  Regular aerobic exercise.  Continue current medications.  Heart failure will be reassessed in 3 months.

## 2024-10-16 ENCOUNTER — TELEPHONE (OUTPATIENT)
Dept: FAMILY MEDICINE CLINIC | Facility: CLINIC | Age: 59
End: 2024-10-16
Payer: MEDICAID

## 2024-10-16 LAB
ALBUMIN SERPL-MCNC: 3.9 G/DL (ref 3.5–5.2)
ALBUMIN/GLOB SERPL: 1.4 G/DL
ALP SERPL-CCNC: 95 U/L (ref 39–117)
ALT SERPL W P-5'-P-CCNC: 22 U/L (ref 1–41)
ANION GAP SERPL CALCULATED.3IONS-SCNC: 12.3 MMOL/L (ref 5–15)
AST SERPL-CCNC: 23 U/L (ref 1–40)
BILIRUB SERPL-MCNC: 0.5 MG/DL (ref 0–1.2)
BUN SERPL-MCNC: 10 MG/DL (ref 8–23)
BUN/CREAT SERPL: 11.4 (ref 7–25)
CALCIUM SPEC-SCNC: 9.2 MG/DL (ref 8.6–10.5)
CHLORIDE SERPL-SCNC: 101 MMOL/L (ref 98–107)
CHOLEST SERPL-MCNC: 174 MG/DL (ref 0–200)
CO2 SERPL-SCNC: 27.7 MMOL/L (ref 22–29)
CREAT SERPL-MCNC: 0.88 MG/DL (ref 0.76–1.27)
EGFRCR SERPLBLD CKD-EPI 2021: 98.4 ML/MIN/1.73
GLOBULIN UR ELPH-MCNC: 2.7 GM/DL
GLUCOSE SERPL-MCNC: 96 MG/DL (ref 65–99)
HDLC SERPL-MCNC: 54 MG/DL (ref 40–60)
LDLC SERPL CALC-MCNC: 101 MG/DL (ref 0–100)
LDLC/HDLC SERPL: 1.84 {RATIO}
MAGNESIUM SERPL-MCNC: 2.2 MG/DL (ref 1.6–2.4)
POTASSIUM SERPL-SCNC: 4.4 MMOL/L (ref 3.5–5.2)
PROT SERPL-MCNC: 6.6 G/DL (ref 6–8.5)
SODIUM SERPL-SCNC: 141 MMOL/L (ref 136–145)
TRIGL SERPL-MCNC: 103 MG/DL (ref 0–150)
TSH SERPL DL<=0.05 MIU/L-ACNC: 1.05 UIU/ML (ref 0.27–4.2)
VLDLC SERPL-MCNC: 19 MG/DL (ref 5–40)

## 2024-10-16 NOTE — TELEPHONE ENCOUNTER
----- Message from Andressa Hayward sent at 10/16/2024  8:39 AM EDT -----    I have reviewed your lab results.  They are overall within the normal range.    Unable to reach patient.    Please relay

## 2024-10-23 RX ORDER — CLONIDINE HYDROCHLORIDE 0.1 MG/1
0.1 TABLET ORAL 2 TIMES DAILY
Qty: 60 TABLET | Refills: 1 | Status: SHIPPED | OUTPATIENT
Start: 2024-10-23

## 2024-11-24 ENCOUNTER — APPOINTMENT (OUTPATIENT)
Dept: CT IMAGING | Facility: HOSPITAL | Age: 59
End: 2024-11-24
Payer: MEDICAID

## 2024-11-24 ENCOUNTER — HOSPITAL ENCOUNTER (EMERGENCY)
Facility: HOSPITAL | Age: 59
Discharge: HOME | End: 2024-11-24
Attending: EMERGENCY MEDICINE | Admitting: EMERGENCY MEDICINE
Payer: MEDICAID

## 2024-11-24 VITALS
HEIGHT: 72 IN | TEMPERATURE: 98.1 F | WEIGHT: 315 LBS | HEART RATE: 67 BPM | BODY MASS INDEX: 42.66 KG/M2 | OXYGEN SATURATION: 99 % | DIASTOLIC BLOOD PRESSURE: 75 MMHG | RESPIRATION RATE: 18 BRPM | SYSTOLIC BLOOD PRESSURE: 136 MMHG

## 2024-11-24 DIAGNOSIS — R10.13 DYSPEPSIA: Primary | ICD-10-CM

## 2024-11-24 DIAGNOSIS — R10.10 ACUTE UPPER ABDOMINAL PAIN: ICD-10-CM

## 2024-11-24 LAB
ALBUMIN SERPL-MCNC: 4.1 G/DL (ref 3.5–5.2)
ALBUMIN/GLOB SERPL: 1.5 G/DL
ALP SERPL-CCNC: 104 U/L (ref 39–117)
ALT SERPL W P-5'-P-CCNC: 35 U/L (ref 1–41)
ANION GAP SERPL CALCULATED.3IONS-SCNC: 10 MMOL/L (ref 5–15)
AST SERPL-CCNC: 27 U/L (ref 1–40)
BACTERIA UR QL AUTO: ABNORMAL /HPF
BASOPHILS # BLD AUTO: 0.01 10*3/MM3 (ref 0–0.2)
BASOPHILS NFR BLD AUTO: 0.1 % (ref 0–1.5)
BILIRUB SERPL-MCNC: 0.6 MG/DL (ref 0–1.2)
BILIRUB UR QL STRIP: NEGATIVE
BUN SERPL-MCNC: 13 MG/DL (ref 8–23)
BUN/CREAT SERPL: 15.3 (ref 7–25)
CALCIUM SPEC-SCNC: 8.9 MG/DL (ref 8.6–10.5)
CHLORIDE SERPL-SCNC: 99 MMOL/L (ref 98–107)
CLARITY UR: CLEAR
CO2 SERPL-SCNC: 29 MMOL/L (ref 22–29)
COLOR UR: ABNORMAL
CREAT SERPL-MCNC: 0.85 MG/DL (ref 0.76–1.27)
D-LACTATE SERPL-SCNC: 2.2 MMOL/L (ref 0.5–2)
DEPRECATED RDW RBC AUTO: 41.9 FL (ref 37–54)
EGFRCR SERPLBLD CKD-EPI 2021: 99.5 ML/MIN/1.73
EOSINOPHIL # BLD AUTO: 0.14 10*3/MM3 (ref 0–0.4)
EOSINOPHIL NFR BLD AUTO: 1.5 % (ref 0.3–6.2)
ERYTHROCYTE [DISTWIDTH] IN BLOOD BY AUTOMATED COUNT: 15.6 % (ref 12.3–15.4)
GLOBULIN UR ELPH-MCNC: 2.7 GM/DL
GLUCOSE SERPL-MCNC: 107 MG/DL (ref 65–99)
GLUCOSE UR STRIP-MCNC: NEGATIVE MG/DL
HCT VFR BLD AUTO: 41 % (ref 37.5–51)
HGB BLD-MCNC: 13.1 G/DL (ref 13–17.7)
HGB UR QL STRIP.AUTO: NEGATIVE
HOLD SPECIMEN: NORMAL
HYALINE CASTS UR QL AUTO: ABNORMAL /LPF
IMM GRANULOCYTES # BLD AUTO: 0.03 10*3/MM3 (ref 0–0.05)
IMM GRANULOCYTES NFR BLD AUTO: 0.3 % (ref 0–0.5)
KETONES UR QL STRIP: ABNORMAL
LEUKOCYTE ESTERASE UR QL STRIP.AUTO: NEGATIVE
LIPASE SERPL-CCNC: 35 U/L (ref 13–60)
LYMPHOCYTES # BLD AUTO: 1.5 10*3/MM3 (ref 0.7–3.1)
LYMPHOCYTES NFR BLD AUTO: 16.4 % (ref 19.6–45.3)
MCH RBC QN AUTO: 23.9 PG (ref 26.6–33)
MCHC RBC AUTO-ENTMCNC: 32 G/DL (ref 31.5–35.7)
MCV RBC AUTO: 75 FL (ref 79–97)
MONOCYTES # BLD AUTO: 0.73 10*3/MM3 (ref 0.1–0.9)
MONOCYTES NFR BLD AUTO: 8 % (ref 5–12)
NEUTROPHILS NFR BLD AUTO: 6.75 10*3/MM3 (ref 1.7–7)
NEUTROPHILS NFR BLD AUTO: 73.7 % (ref 42.7–76)
NITRITE UR QL STRIP: NEGATIVE
NRBC BLD AUTO-RTO: 0 /100 WBC (ref 0–0.2)
PH UR STRIP.AUTO: 6 [PH] (ref 5–8)
PLATELET # BLD AUTO: 174 10*3/MM3 (ref 140–450)
PMV BLD AUTO: 12.4 FL (ref 6–12)
POTASSIUM SERPL-SCNC: 3.7 MMOL/L (ref 3.5–5.2)
PROT SERPL-MCNC: 6.8 G/DL (ref 6–8.5)
PROT UR QL STRIP: ABNORMAL
RBC # BLD AUTO: 5.47 10*6/MM3 (ref 4.14–5.8)
RBC # UR STRIP: ABNORMAL /HPF
REF LAB TEST METHOD: ABNORMAL
SODIUM SERPL-SCNC: 138 MMOL/L (ref 136–145)
SP GR UR STRIP: >=1.03 (ref 1–1.03)
SQUAMOUS #/AREA URNS HPF: ABNORMAL /HPF
TROPONIN T SERPL HS-MCNC: 7 NG/L
UROBILINOGEN UR QL STRIP: ABNORMAL
WBC # UR STRIP: ABNORMAL /HPF
WBC NRBC COR # BLD AUTO: 9.16 10*3/MM3 (ref 3.4–10.8)
WHOLE BLOOD HOLD COAG: NORMAL
WHOLE BLOOD HOLD SPECIMEN: NORMAL

## 2024-11-24 PROCEDURE — 25810000003 SODIUM CHLORIDE 0.9 % SOLUTION: Performed by: EMERGENCY MEDICINE

## 2024-11-24 PROCEDURE — 83605 ASSAY OF LACTIC ACID: CPT | Performed by: EMERGENCY MEDICINE

## 2024-11-24 PROCEDURE — 83690 ASSAY OF LIPASE: CPT | Performed by: EMERGENCY MEDICINE

## 2024-11-24 PROCEDURE — 85025 COMPLETE CBC W/AUTO DIFF WBC: CPT | Performed by: EMERGENCY MEDICINE

## 2024-11-24 PROCEDURE — 96375 TX/PRO/DX INJ NEW DRUG ADDON: CPT

## 2024-11-24 PROCEDURE — 84484 ASSAY OF TROPONIN QUANT: CPT | Performed by: EMERGENCY MEDICINE

## 2024-11-24 PROCEDURE — 99285 EMERGENCY DEPT VISIT HI MDM: CPT

## 2024-11-24 PROCEDURE — 25010000002 MORPHINE PER 10 MG: Performed by: EMERGENCY MEDICINE

## 2024-11-24 PROCEDURE — 81001 URINALYSIS AUTO W/SCOPE: CPT | Performed by: EMERGENCY MEDICINE

## 2024-11-24 PROCEDURE — 96374 THER/PROPH/DIAG INJ IV PUSH: CPT

## 2024-11-24 PROCEDURE — 74177 CT ABD & PELVIS W/CONTRAST: CPT

## 2024-11-24 PROCEDURE — 25510000001 IOPAMIDOL 61 % SOLUTION: Performed by: EMERGENCY MEDICINE

## 2024-11-24 PROCEDURE — 96376 TX/PRO/DX INJ SAME DRUG ADON: CPT

## 2024-11-24 PROCEDURE — 25010000002 ONDANSETRON PER 1 MG: Performed by: EMERGENCY MEDICINE

## 2024-11-24 PROCEDURE — 80053 COMPREHEN METABOLIC PANEL: CPT | Performed by: EMERGENCY MEDICINE

## 2024-11-24 RX ORDER — IOPAMIDOL 612 MG/ML
100 INJECTION, SOLUTION INTRAVASCULAR
Status: COMPLETED | OUTPATIENT
Start: 2024-11-24 | End: 2024-11-24

## 2024-11-24 RX ORDER — SODIUM CHLORIDE 0.9 % (FLUSH) 0.9 %
10 SYRINGE (ML) INJECTION AS NEEDED
Status: DISCONTINUED | OUTPATIENT
Start: 2024-11-24 | End: 2024-11-24 | Stop reason: HOSPADM

## 2024-11-24 RX ORDER — SODIUM CHLORIDE 9 MG/ML
10 INJECTION, SOLUTION INTRAMUSCULAR; INTRAVENOUS; SUBCUTANEOUS AS NEEDED
Status: DISCONTINUED | OUTPATIENT
Start: 2024-11-24 | End: 2024-11-24 | Stop reason: HOSPADM

## 2024-11-24 RX ORDER — MORPHINE SULFATE 4 MG/ML
4 INJECTION, SOLUTION INTRAMUSCULAR; INTRAVENOUS ONCE
Status: COMPLETED | OUTPATIENT
Start: 2024-11-24 | End: 2024-11-24

## 2024-11-24 RX ORDER — PANTOPRAZOLE SODIUM 20 MG/1
40 TABLET, DELAYED RELEASE ORAL 2 TIMES DAILY
Qty: 120 TABLET | Refills: 0 | Status: SHIPPED | OUTPATIENT
Start: 2024-11-24 | End: 2024-12-24

## 2024-11-24 RX ORDER — ONDANSETRON 2 MG/ML
4 INJECTION INTRAMUSCULAR; INTRAVENOUS ONCE
Status: COMPLETED | OUTPATIENT
Start: 2024-11-24 | End: 2024-11-24

## 2024-11-24 RX ORDER — SUCRALFATE 1 G/1
1 TABLET ORAL 4 TIMES DAILY
Qty: 120 TABLET | Refills: 0 | Status: SHIPPED | OUTPATIENT
Start: 2024-11-24 | End: 2024-12-24

## 2024-11-24 RX ADMIN — SODIUM CHLORIDE 1000 ML: 9 INJECTION, SOLUTION INTRAVENOUS at 16:18

## 2024-11-24 RX ADMIN — MORPHINE SULFATE 4 MG: 4 INJECTION, SOLUTION INTRAMUSCULAR; INTRAVENOUS at 16:18

## 2024-11-24 RX ADMIN — MORPHINE SULFATE 4 MG: 4 INJECTION, SOLUTION INTRAMUSCULAR; INTRAVENOUS at 18:24

## 2024-11-24 RX ADMIN — ONDANSETRON 4 MG: 2 INJECTION INTRAMUSCULAR; INTRAVENOUS at 13:40

## 2024-11-24 RX ADMIN — MORPHINE SULFATE 4 MG: 4 INJECTION, SOLUTION INTRAMUSCULAR; INTRAVENOUS at 14:12

## 2024-11-24 RX ADMIN — IOPAMIDOL 85 ML: 612 INJECTION, SOLUTION INTRAVENOUS at 15:14

## 2024-11-24 NOTE — ED PROVIDER NOTES
Subjective   History of Present Illness  59-year-old male who presents with complaint of abdominal pain in the periumbilical/epigastric region.  The patient states that he began to have the pain last night.  This persisted into today.  He describes it as a burning sensation.  No radiation into the chest.  No chest pain.  No runny nose cough congestion or sore throat.  No new medications.  No diarrhea or blood in the stool.  No dysuria.  The pain began at 1 AM this morning.  He also reports nausea and vomiting.  Over-the-counter medication has not help with the pain.  No previous abdominal surgeries.  He reports normal passage of gas.  No other acute complaints      Review of Systems   Constitutional:  Positive for activity change and appetite change. Negative for chills, fatigue and fever.   HENT:  Negative for congestion, ear pain, postnasal drip, sinus pressure and sore throat.    Eyes:  Negative for pain, redness and visual disturbance.   Respiratory:  Negative for cough, chest tightness and shortness of breath.    Cardiovascular:  Negative for chest pain, palpitations and leg swelling.   Gastrointestinal:  Positive for abdominal pain, nausea and vomiting. Negative for anal bleeding, blood in stool and diarrhea.   Endocrine: Negative for polydipsia and polyuria.   Genitourinary:  Negative for difficulty urinating, dysuria, frequency and urgency.   Musculoskeletal:  Negative for arthralgias, back pain and neck pain.   Skin:  Negative for pallor and rash.   Allergic/Immunologic: Negative for environmental allergies and immunocompromised state.   Neurological:  Negative for dizziness, weakness and headaches.   Hematological:  Negative for adenopathy.   Psychiatric/Behavioral:  Negative for confusion, self-injury and suicidal ideas. The patient is not nervous/anxious.    All other systems reviewed and are negative.      Past Medical History:   Diagnosis Date    Arthritis     Hypertension        No Known  Allergies    No past surgical history on file.    No family history on file.    Social History     Socioeconomic History    Marital status: Single   Tobacco Use    Smoking status: Never    Smokeless tobacco: Current     Types: Chew   Vaping Use    Vaping status: Never Used   Substance and Sexual Activity    Alcohol use: Yes     Comment: socially    Drug use: No    Sexual activity: Defer           Objective   Physical Exam  Vitals and nursing note reviewed.   Constitutional:       General: He is not in acute distress.     Appearance: Normal appearance. He is well-developed. He is not toxic-appearing or diaphoretic.   HENT:      Head: Normocephalic and atraumatic.      Right Ear: External ear normal.      Left Ear: External ear normal.      Nose: Nose normal.   Eyes:      General: Lids are normal.      Pupils: Pupils are equal, round, and reactive to light.   Neck:      Trachea: No tracheal deviation.   Cardiovascular:      Rate and Rhythm: Normal rate and regular rhythm.      Pulses: No decreased pulses.      Heart sounds: Normal heart sounds. No murmur heard.     No friction rub. No gallop.   Pulmonary:      Effort: Pulmonary effort is normal. No respiratory distress.      Breath sounds: Normal breath sounds. No decreased breath sounds, wheezing, rhonchi or rales.   Abdominal:      General: Bowel sounds are normal.      Palpations: Abdomen is soft.      Tenderness: There is abdominal tenderness in the epigastric area. There is no guarding or rebound.   Musculoskeletal:         General: No deformity. Normal range of motion.      Cervical back: Normal range of motion and neck supple.   Lymphadenopathy:      Cervical: No cervical adenopathy.   Skin:     General: Skin is warm and dry.      Findings: No rash.   Neurological:      Mental Status: He is alert and oriented to person, place, and time.      Cranial Nerves: No cranial nerve deficit.      Sensory: No sensory deficit.   Psychiatric:         Speech: Speech normal.          Behavior: Behavior normal.         Thought Content: Thought content normal.         Judgment: Judgment normal.         Procedures           ED Course                                                       Medical Decision Making  Differential diagnosis includes pancreatitis, gastritis, bowel obstruction, constipation, other unspecified etiology.    Labs show elevated specific gravity concern for dehydration.  IV fluids were administered.  The patient also received pain medication and nose medication.  Labs show normal white count, normal H&H, normal kidney function electrolytes, normal troponin, normal lipase.    CT scan abdomen pelvis with contrast shows no acute abnormalities.    The patient's presentation is concerning for dyspepsia.  He will be advised not to eat or drink anything for 3 hours before he goes to sleep.  Advised to sleep with head elevated.    Take Protonix twice daily.  Avoid fatty, acidic, or spicy foods.    Follow-up with GI for repeat evaluation as soon as possible.    Return to the ER with any further concern.    Problems Addressed:  Acute upper abdominal pain: complicated acute illness or injury with systemic symptoms  Dyspepsia: complicated acute illness or injury with systemic symptoms    Amount and/or Complexity of Data Reviewed  External Data Reviewed: labs and radiology.  Labs: ordered. Decision-making details documented in ED Course.  Radiology: ordered and independent interpretation performed. Decision-making details documented in ED Course.    Risk  Prescription drug management.        Final diagnoses:   Dyspepsia   Acute upper abdominal pain       ED Disposition  ED Disposition       ED Disposition   Discharge    Condition   Stable    Comment   --               [facility]  [address]  [facility] [address]  [facility] [id number] [phone]        Andressa Hayward DO  [address]  [facility] [address]  [phone]    In  1 week           Medication List        New Prescriptions      pantoprazole 20 MG EC tablet  Commonly known as: PROTONIX  Take 2 tablets by mouth 2 (Two) Times a Day for 30 days.     sucralfate 1 g tablet  Commonly known as: CARAFATE  Take 1 tablet by mouth 4 (Four) Times a Day for 30 days.               Where to Get Your Medications        These medications were sent to [facility] [id number] - [place], [place] - [facility] [address] AT [facility] [address] - [phone]  - [phone] [address] [place], [facility] [address]      Phone: [phone]   pantoprazole 20 MG EC tablet  sucralfate 1 g tablet            Reyna Montoya MD  11/27/24 [id number]

## 2024-11-24 NOTE — DISCHARGE INSTRUCTIONS
Take Protonix twice daily.    Avoid fatty, acidic, or spicy foods.    Avoid NSAID use.    Sleep with the head elevated and do not eat anything for 3 hours before you go to sleep.    Take Carafate before meals and use Pepto-Bismol to help calm any upper abdominal pain.    Follow-up with GI for outpatient evaluation.

## 2024-12-17 ENCOUNTER — OFFICE VISIT (OUTPATIENT)
Dept: CARDIOLOGY | Facility: CLINIC | Age: 59
End: 2024-12-17
Payer: MEDICAID

## 2024-12-17 VITALS
WEIGHT: 315 LBS | OXYGEN SATURATION: 93 % | HEART RATE: 110 BPM | SYSTOLIC BLOOD PRESSURE: 118 MMHG | HEIGHT: 72 IN | DIASTOLIC BLOOD PRESSURE: 82 MMHG | BODY MASS INDEX: 42.66 KG/M2

## 2024-12-17 DIAGNOSIS — I50.32 DIASTOLIC CHF, CHRONIC: Primary | ICD-10-CM

## 2024-12-17 DIAGNOSIS — I10 ESSENTIAL HYPERTENSION: ICD-10-CM

## 2024-12-17 RX ORDER — CLONIDINE HYDROCHLORIDE 0.1 MG/1
0.1 TABLET ORAL DAILY
Qty: 60 TABLET | Refills: 1 | Status: SHIPPED | OUTPATIENT
Start: 2024-12-17

## 2024-12-17 RX ORDER — BUMETANIDE 2 MG/1
2 TABLET ORAL DAILY
Qty: 60 TABLET | Refills: 6 | Status: SHIPPED | OUTPATIENT
Start: 2024-12-17

## 2024-12-17 NOTE — PROGRESS NOTES
[facility] at    OFFICE NOTE      Freeman Jean  1965  PCP: Andressa Hayward DO    SUBJECTIVE:   Freeman Jean is a 59 y.o. male seen for a follow up visit regarding the following:     CC:CHF    HPI:   Pleasant 59-year-old gentleman presents today for diastolic heart failure, edema.  He has been follow with  for venous insufficiency and wound care his legs are now healed well.  He is on Lasix 80 twice daily but he wants to try something different to make more urine response.  He is on prednisone due to arthritis.  He thinks this is causing him to swell more.  He states his blood pressure pretty well-controlled on current regiment.  He denies chest pain dizziness near syncope or syncope.  His main worry is that he feels like he has gained too much weight cannot lose this and worried about edema progressing.      Cardiac PMH: (Old records have been reviewed and summarized below)  Congestive heart failure  Hypertension  Rheumatoid arthritis, followed by rheumatologist  Chronic venous insufficiency  Super morbid obesity, BMI is over 47 (patient was 278 pounds spring 2024  is now currently 344 pounds)  Lower extremity blisters, bullae  Iron deficiency anemia    Past Medical History, Past Surgical History, Family history, Social History, and Medications were all reviewed with the patient today and updated as necessary.       Current Outpatient Medications:     acetaminophen (TYLENOL) 500 MG tablet, Take 1 tablet by mouth Every 6 (Six) Hours As Needed for Mild Pain., Disp: 120 tablet, Rfl: 5    ammonium lactate (AMLACTIN) 12 % cream, Apply 1 Application topically to the appropriate area as directed As Needed., Disp: , Rfl:     cloNIDine (CATAPRES) 0.1 MG tablet, TAKE 1 TABLET BY MOUTH 2 TIMES A DAY, Disp: 60 tablet, Rfl: 1    folic acid (FOLVITE) 1 MG tablet, Take 1 tablet by mouth Daily., Disp: , Rfl:     furosemide (Lasix) 80 MG tablet, Take 1 tablet by mouth 2  "(Two) Times a Day., Disp: 60 tablet, Rfl: 5    hydrALAZINE (APRESOLINE) 50 MG tablet, Take 1 tablet by mouth 3 (Three) Times a Day., Disp: 90 tablet, Rfl: 5    HYDROcodone-acetaminophen (NORCO)  MG per tablet, Take 1 tablet by mouth Every 6 (Six) Hours As Needed for Moderate Pain., Disp: 12 tablet, Rfl: 0    methotrexate 2.5 MG tablet, Take 8 tablets by mouth., Disp: , Rfl:     METHOTREXATE PO, Take  by mouth., Disp: , Rfl:     mupirocin (BACTROBAN) 2 % ointment, Apply 1 g topically daily to wound for 14 days or until healed, Disp: 14 g, Rfl: 1    olopatadine (PATADAY) 0.2 % solution ophthalmic solution, Administer 1 drop to both eyes Daily., Disp: 2.5 mL, Rfl: 0    pantoprazole (PROTONIX) 20 MG EC tablet, Take 2 tablets by mouth 2 (Two) Times a Day for 30 days., Disp: 120 tablet, Rfl: 0    potassium chloride (MICRO-K) 10 MEQ CR capsule, Take 2 capsules by mouth 2 (Two) Times a Day., Disp: 60 capsule, Rfl: 5    predniSONE (DELTASONE) 10 MG tablet, 40mg PO daily for 5 days, then 20mg PO daily for 5 days, then 10mg PO daily for 5 days, Disp: 35 tablet, Rfl: 0    sacubitril-valsartan (Entresto) 24-26 MG tablet, Take 1 tablet by mouth 2 (Two) Times a Day. Start 7/20/2024, Disp: 60 tablet, Rfl: 6    sucralfate (CARAFATE) 1 g tablet, Take 1 tablet by mouth 4 (Four) Times a Day for 30 days., Disp: 120 tablet, Rfl: 0    Tocilizumab (ACTEMRA SC), Inject  under the skin into the appropriate area as directed., Disp: , Rfl:       No Known Allergies      PHYSICAL EXAM:    /82   Pulse 110   Ht 182.9 cm (72\")   Wt (!) 157 kg (346 lb)   SpO2 93%   BMI 46.93 kg/m²        Wt Readings from Last 5 Encounters:   12/17/24 (!) 157 kg (346 lb)   11/24/24 (!) 154 kg (340 lb)   10/15/24 (!) 155 kg (341 lb)   08/26/24 (!) 157 kg (346 lb 2 oz)   08/23/24 (!) 157 kg (346 lb 3.2 oz)       BP Readings from Last 5 Encounters:   12/17/24 118/82   11/24/24 136/75   10/15/24 135/97   08/26/24 175/79   08/23/24 114/60       General " appearance - Alert, well appearing, and in no distress   Mental status - Affect appropriate to mood.  Eyes - Sclerae anicteric,  ENMT - Hearing grossly normal bilaterally, Dental hygiene good.  Neck - Carotids upstroke normal bilaterally, no bruits, no JVD.  Resp - Clear to auscultation, no wheezes, rales or rhonchi, symmetric air entry.  Heart - Normal rate, regular rhythm, normal S1, S2, no murmurs, rubs, clicks or gallops.  GI - Soft, nontender, nondistended, no masses or organomegaly.  Neurological - Grossly intact - normal speech, no focal findings  .  Extremities -3+ lower extremity edema  Skin - Normal coloration and turgor.  Psych -  oriented to person, place, and time.    Medical problems and test results were reviewed with the patient today.         Echocardiogram 8/26/2024  Interpretation Summary         Left ventricular systolic function is normal. Left ventricular ejection fraction appears to be 66 - 70%. Normal left ventricular cavity size noted. Left ventricular wall thickness is consistent with moderate concentric hypertrophy. Left ventricular diastolic function is consistent with (grade I) impaired relaxation.    Right ventricle not well visualized. Normal right ventricular cavity size, wall thickness and systolic function noted.    Left atrium not well visualized. Normal left atrial size appears normal.    The aortic valve is not well visualized. No aortic valve regurgitation or stenosis is present. The aortic valve is grossly normal in structure.    Mitral valve is not well visualized. The mitral valve is grossly normal in structure. No mitral valve regurgitation is present.    Tricuspid valve not well visualized. The tricuspid valve is grossly normal in structure. No evidence of significant tricuspid valve regurgitation is present      ASSESSMENT   1. CHF, diastolic dysfunction. Diuresis.  Will change Lasix to Bumex 2 mg twice daily he thinks this may work better.    2. Anasarca, Lower extemity  edema, diurteics, antibiotis for cellulitis. Now followed by , ammonium Lactate cream.  His wounds of healed well.    3. Super morbid obesity, 346lbs. he needs to lose weight.  He knows this.    4. HTN: Enresto, Hydrlazine , better control, wean off clonidine to 0.1 mg daily.  Eventually want to discontinue his continue hydralazine and Entresto to manage her blood pressure from now.    PLAN  Change Lasix to Bumex 2 mg twice daily follow BMP in 2 weeks  Wean off of clonidine for blood pressure, continue Entresto hydralazine  He needs to lose weight diet exercise  Return follow-up or office in 6 months or sooner as needed.          12/17/2024  13:48 EST  Electronically signed by DENNISE Wong, 12/17/24, 3:49 PM EST.

## 2025-02-03 DIAGNOSIS — I10 ESSENTIAL HYPERTENSION: ICD-10-CM

## 2025-02-03 DIAGNOSIS — I50.32 DIASTOLIC CHF, CHRONIC: ICD-10-CM

## 2025-02-03 DIAGNOSIS — H10.13 ALLERGIC CONJUNCTIVITIS OF BOTH EYES: ICD-10-CM

## 2025-02-03 RX ORDER — POTASSIUM CHLORIDE 750 MG/1
20 CAPSULE, EXTENDED RELEASE ORAL 2 TIMES DAILY
Qty: 360 CAPSULE | Refills: 0 | Status: SHIPPED | OUTPATIENT
Start: 2025-02-03

## 2025-02-03 NOTE — TELEPHONE ENCOUNTER
Caller: Freeman Jean    Relationship: Self    Best call back number: [phone]     Requested Prescriptions:   Requested Prescriptions     Pending Prescriptions Disp Refills    bumetanide (BUMEX) 2 MG tablet 60 tablet 6     Sig: Take 1 tablet by mouth Daily.    cloNIDine (CATAPRES) 0.1 MG tablet 60 tablet 1     Sig: Take 1 tablet by mouth Daily.    folic acid (FOLVITE) 1 MG tablet       Sig: Take 1 tablet by mouth Daily.    hydrALAZINE (APRESOLINE) 50 MG tablet 90 tablet 5     Sig: Take 1 tablet by mouth 3 (Three) Times a Day.    methotrexate 2.5 MG tablet       Sig: Take 8 tablets by mouth.    olopatadine (PATADAY) 0.2 % solution ophthalmic solution 2.5 mL 0     Sig: Administer 1 drop to both eyes Daily.    predniSONE (DELTASONE) 10 MG tablet 35 tablet 0     Simg PO daily for 5 days, then  20mg PO daily for 5 days, then  10mg PO daily for 5 days        Pharmacy where request should be sent: [facility] [id number] [place] [address] [address] AT [facility] [address] - [phone]  - [phone] FX     Last office visit with prescribing clinician: 10/15/2024   Last telemedicine visit with prescribing clinician: Visit date not found   Next office visit with prescribing clinician: Visit date not found         Does the patient have less than a 3 day supply:  [x] Yes  [] No    Would you like a call back once the refill request has been completed: [] Yes [x] No    If the office needs to give you a call back, can they leave a voicemail: [] Yes [x] No    Vineet Regalado   25 16:17 EST

## 2025-02-04 RX ORDER — HYDRALAZINE HYDROCHLORIDE 50 MG/1
50 TABLET, FILM COATED ORAL 3 TIMES DAILY
Qty: 90 TABLET | Refills: 5 | Status: SHIPPED | OUTPATIENT
Start: 2025-02-04

## 2025-02-04 RX ORDER — CLONIDINE HYDROCHLORIDE 0.1 MG/1
0.1 TABLET ORAL DAILY
Qty: 60 TABLET | Refills: 1 | OUTPATIENT
Start: 2025-02-04

## 2025-02-04 RX ORDER — PREDNISONE 10 MG/1
TABLET ORAL
Qty: 35 TABLET | Refills: 0 | OUTPATIENT
Start: 2025-02-04

## 2025-02-04 RX ORDER — OLOPATADINE HYDROCHLORIDE 2 MG/ML
1 SOLUTION/ DROPS OPHTHALMIC DAILY
Qty: 2.5 ML | Refills: 0 | Status: SHIPPED | OUTPATIENT
Start: 2025-02-04

## 2025-02-04 RX ORDER — BUMETANIDE 2 MG/1
2 TABLET ORAL DAILY
Qty: 60 TABLET | Refills: 6 | OUTPATIENT
Start: 2025-02-04

## 2025-02-04 RX ORDER — FOLIC ACID 1 MG/1
1 TABLET ORAL DAILY
Qty: 90 TABLET | Refills: 3 | Status: SHIPPED | OUTPATIENT
Start: 2025-02-04

## 2025-02-04 RX ORDER — METHOTREXATE 2.5 MG/1
20 TABLET ORAL
Status: CANCELLED | OUTPATIENT
Start: 2025-02-04

## 2025-02-04 NOTE — TELEPHONE ENCOUNTER
I have not previously prescribed this patient's methotrexate.  This is typically done by rheumatologist.  I would recommend he reach out to them for further fills.  If he needs to establish with a new rheumatologist please let me know and I can continue a short course of IV get this arranged.  If he does need me to fill this please ask his frequency that he is taking this medication

## 2025-02-04 NOTE — TELEPHONE ENCOUNTER
Rx Refill Note  Requested Prescriptions     Pending Prescriptions Disp Refills    folic acid (FOLVITE) 1 MG tablet       Sig: Take 1 tablet by mouth Daily.    hydrALAZINE (APRESOLINE) 50 MG tablet 90 tablet 5     Sig: Take 1 tablet by mouth 3 (Three) Times a Day.    methotrexate 2.5 MG tablet       Sig: Take 8 tablets by mouth.    olopatadine (PATADAY) 0.2 % solution ophthalmic solution 2.5 mL 0     Sig: Administer 1 drop to both eyes Daily.     Refused Prescriptions Disp Refills    bumetanide (BUMEX) 2 MG tablet 60 tablet 6     Sig: Take 1 tablet by mouth Daily.     Refused By: PAYAL GALLAGHER     Reason for Refusal: Prescriber not at this practice    cloNIDine (CATAPRES) 0.1 MG tablet 60 tablet 1     Sig: Take 1 tablet by mouth Daily.     Refused By: PAYAL GALLAGHER     Reason for Refusal: Prescriber not at this practice    predniSONE (DELTASONE) 10 MG tablet 35 tablet 0     Simg PO daily for 5 days, then  20mg PO daily for 5 days, then  10mg PO daily for 5 days     Refused By: PAYAL GALLAGHER     Reason for Refusal: Refill not appropriate      Last office visit with prescribing clinician: 10/15/2024   Last telemedicine visit with prescribing clinician: Visit date not found   Next office visit with prescribing clinician: Visit date not found                         Would you like a call back once the refill request has been completed: [] Yes [] No    If the office needs to give you a call back, can they leave a voicemail: [] Yes [] No    Payal Gallagher MA  25, 10:56 EST

## 2025-02-05 RX ORDER — CLONIDINE HYDROCHLORIDE 0.1 MG/1
0.1 TABLET ORAL 2 TIMES DAILY
Qty: 60 TABLET | Refills: 3 | Status: SHIPPED | OUTPATIENT
Start: 2025-02-05

## 2025-03-02 DIAGNOSIS — L97.929 VENOUS ULCER OF LEFT LEG: ICD-10-CM

## 2025-03-02 DIAGNOSIS — I83.029 VENOUS ULCER OF LEFT LEG: ICD-10-CM

## 2025-03-03 RX ORDER — PSEUDOEPHED/ACETAMINOPH/DIPHEN 30MG-500MG
1 TABLET ORAL EVERY 6 HOURS PRN
Qty: 120 TABLET | Refills: 1 | Status: SHIPPED | OUTPATIENT
Start: 2025-03-03

## 2025-05-15 ENCOUNTER — OFFICE VISIT (OUTPATIENT)
Dept: FAMILY MEDICINE CLINIC | Facility: CLINIC | Age: 60
End: 2025-05-15
Payer: MEDICAID

## 2025-05-15 VITALS
SYSTOLIC BLOOD PRESSURE: 142 MMHG | HEART RATE: 89 BPM | DIASTOLIC BLOOD PRESSURE: 84 MMHG | BODY MASS INDEX: 42.66 KG/M2 | OXYGEN SATURATION: 97 % | HEIGHT: 72 IN | WEIGHT: 315 LBS

## 2025-05-15 DIAGNOSIS — I10 ESSENTIAL HYPERTENSION: Primary | ICD-10-CM

## 2025-05-15 DIAGNOSIS — M25.552 LEFT HIP PAIN: ICD-10-CM

## 2025-05-15 DIAGNOSIS — I83.029 VENOUS ULCER OF LEFT LEG: ICD-10-CM

## 2025-05-15 DIAGNOSIS — K42.9 UMBILICAL HERNIA WITHOUT OBSTRUCTION AND WITHOUT GANGRENE: ICD-10-CM

## 2025-05-15 DIAGNOSIS — I50.32 DIASTOLIC CHF, CHRONIC: ICD-10-CM

## 2025-05-15 DIAGNOSIS — L97.929 VENOUS ULCER OF LEFT LEG: ICD-10-CM

## 2025-05-15 DIAGNOSIS — B37.2 CANDIDIASIS, INTERTRIGO: ICD-10-CM

## 2025-05-15 DIAGNOSIS — E66.01 MORBID OBESITY WITH BODY MASS INDEX (BMI) OF 40.0 OR HIGHER: ICD-10-CM

## 2025-05-15 RX ORDER — BUMETANIDE 2 MG/1
2 TABLET ORAL DAILY
Qty: 60 TABLET | Refills: 6 | Status: SHIPPED | OUTPATIENT
Start: 2025-05-15

## 2025-05-15 RX ORDER — POTASSIUM CHLORIDE 750 MG/1
20 CAPSULE, EXTENDED RELEASE ORAL 2 TIMES DAILY
Qty: 360 CAPSULE | Refills: 0 | Status: SHIPPED | OUTPATIENT
Start: 2025-05-15

## 2025-05-15 RX ORDER — SACUBITRIL AND VALSARTAN 24; 26 MG/1; MG/1
1 TABLET, FILM COATED ORAL 2 TIMES DAILY
Qty: 60 TABLET | Refills: 6 | Status: SHIPPED | OUTPATIENT
Start: 2025-05-15

## 2025-05-15 RX ORDER — FUROSEMIDE 80 MG/1
80 TABLET ORAL DAILY
COMMUNITY
Start: 2025-05-03 | End: 2025-05-15

## 2025-05-15 RX ORDER — CLONIDINE HYDROCHLORIDE 0.1 MG/1
0.1 TABLET ORAL 2 TIMES DAILY
Qty: 60 TABLET | Refills: 3 | Status: SHIPPED | OUTPATIENT
Start: 2025-05-15

## 2025-05-15 RX ORDER — NYSTATIN 100000 U/G
1 CREAM TOPICAL 2 TIMES DAILY
Qty: 30 G | Refills: 0 | Status: SHIPPED | OUTPATIENT
Start: 2025-05-15 | End: 2025-05-29

## 2025-05-15 RX ORDER — PSEUDOEPHED/ACETAMINOPH/DIPHEN 30MG-500MG
1 TABLET ORAL EVERY 6 HOURS PRN
Qty: 120 TABLET | Refills: 1 | Status: SHIPPED | OUTPATIENT
Start: 2025-05-15

## 2025-05-15 RX ORDER — HYDROCODONE BITARTRATE AND ACETAMINOPHEN 10; 325 MG/1; MG/1
1 TABLET ORAL EVERY 6 HOURS PRN
Qty: 12 TABLET | Refills: 0 | Status: SHIPPED | OUTPATIENT
Start: 2025-05-15

## 2025-05-15 RX ORDER — BUMETANIDE 2 MG/1
2 TABLET ORAL DAILY
Qty: 60 TABLET | Refills: 6 | Status: CANCELLED | OUTPATIENT
Start: 2025-05-15

## 2025-05-15 NOTE — PROGRESS NOTES
Office Note     Name: Freeman Jean    : 1965     MRN: [id number]     Chief Complaint  Abdominal Pain (Under the bottom of his stomach he states its raw and he states its painful around his navel ) and Med Refill (Needs medication refills )    Subjective     History of Present Illness:  Freeman Jean is a 59 y.o. male who presents today for FU. He currently lives at home with disabled individual and his son.  He notes he is currently unemployed and is working towards getting his disability.  He notes that he has a disability hearing soon.  He is originally from [facility].       Patient has a longstanding history of hypertension.  He is currently prescribed valsartan, hydralazine, and clonidine.  Patient also was prescribed Bumex for peripheral edema.  He is following with cardiology and lymphedema clinic.       Patient does have a previous history of rheumatoid arthritis and recently followed with his rheumatologist.  He is currently prescribed Actemra, methotrexate.  Patient notes that he has chronic arthralgias in his low back, left hip, and left lower extremity.  He notes that he has significant pain and is unable to tolerate NSAIDs due to his comorbidities.  He is interested in referral to pain management today.    Patient notes that he has persistent pain in his left lateral abdomen.  He notes that he feels that it is swollen and is tearing at times.  He also notices a significant pain around his navel.     Patient notes that his father had cancer.  He is unsure what type.      Review of Systems:   Review of Systems   Cardiovascular:  Positive for leg swelling.   Gastrointestinal:  Positive for abdominal distention and abdominal pain.   Musculoskeletal:  Positive for arthralgias and back pain.   All other systems reviewed and are negative.      Past Medical History:   Past Medical History:   Diagnosis Date    Arthritis     Hypertension        Past Surgical History: History  reviewed. No pertinent surgical history.    Family History: History reviewed. No pertinent family history.    Social History:   Social History     Socioeconomic History    Marital status: Single   Tobacco Use    Smoking status: Never    Smokeless tobacco: Current     Types: Chew   Vaping Use    Vaping status: Never Used   Substance and Sexual Activity    Alcohol use: Yes     Comment: socially    Drug use: No    Sexual activity: Defer       Immunizations:   Immunization History   Administered Date(s) Administered    COVID-19 (PFIZER) Purple Cap Monovalent 02/24/2021        Medications:     Current Outpatient Medications:     Acetaminophen Extra Strength 500 MG tablet, Take 1 tablet by mouth Every 6 (Six) Hours As Needed (pain)., Disp: 120 tablet, Rfl: 1    ammonium lactate (AMLACTIN) 12 % cream, Apply 1 Application topically to the appropriate area as directed As Needed., Disp: , Rfl:     bumetanide (BUMEX) 2 MG tablet, Take 1 tablet by mouth Daily., Disp: 60 tablet, Rfl: 6    cloNIDine (CATAPRES) 0.1 MG tablet, Take 1 tablet by mouth 2 (Two) Times a Day., Disp: 60 tablet, Rfl: 3    folic acid (FOLVITE) 1 MG tablet, Take 1 tablet by mouth Daily., Disp: 90 tablet, Rfl: 3    hydrALAZINE (APRESOLINE) 50 MG tablet, Take 1 tablet by mouth 3 (Three) Times a Day., Disp: 90 tablet, Rfl: 5    HYDROcodone-acetaminophen (NORCO)  MG per tablet, Take 1 tablet by mouth Every 6 (Six) Hours As Needed for Moderate Pain., Disp: 12 tablet, Rfl: 0    methotrexate 2.5 MG tablet, Take 8 tablets by mouth., Disp: , Rfl:     METHOTREXATE PO, Take  by mouth., Disp: , Rfl:     mupirocin (BACTROBAN) 2 % ointment, Apply 1 g topically daily to wound for 14 days or until healed, Disp: 14 g, Rfl: 1    olopatadine (PATADAY) 0.2 % solution ophthalmic solution, Administer 1 drop to both eyes Daily., Disp: 2.5 mL, Rfl: 0    potassium chloride (MICRO-K) 10 MEQ CR capsule, Take 2 capsules by mouth 2 (Two) Times a Day., Disp: 360 capsule, Rfl: 0     "predniSONE (DELTASONE) 10 MG tablet, 40mg PO daily for 5 days, then 20mg PO daily for 5 days, then 10mg PO daily for 5 days, Disp: 35 tablet, Rfl: 0    sacubitril-valsartan (Entresto) 24-26 MG tablet, Take 1 tablet by mouth 2 (Two) Times a Day. Start 7/20/2024, Disp: 60 tablet, Rfl: 6    Tocilizumab (ACTEMRA SC), Inject  under the skin into the appropriate area as directed., Disp: , Rfl:     nystatin (MYCOSTATIN) 403859 UNIT/GM cream, Apply 1 Application topically to the appropriate area as directed 2 (Two) Times a Day for 14 days., Disp: 30 g, Rfl: 0    Allergies:   No Known Allergies    Objective     Vital Signs  /84   Pulse 89   Ht 182.9 cm (72.01\")   Wt (!) 147 kg (324 lb)   SpO2 97%   BMI 43.93 kg/m²   Estimated body mass index is 43.93 kg/m² as calculated from the following:    Height as of this encounter: 182.9 cm (72.01\").    Weight as of this encounter: 147 kg (324 lb).         Physical Exam  Vitals and nursing note reviewed.   Constitutional:       Appearance: Normal appearance. He is obese.   HENT:      Head: Normocephalic and atraumatic.      Nose: Nose normal.      Mouth/Throat:      Mouth: Mucous membranes are moist.   Eyes:      Extraocular Movements: Extraocular movements intact.      Pupils: Pupils are equal, round, and reactive to light.   Cardiovascular:      Rate and Rhythm: Normal rate.   Pulmonary:      Effort: Pulmonary effort is normal.   Abdominal:      General: Abdomen is flat. There is distension.      Hernia: A hernia is present.   Musculoskeletal:         General: Normal range of motion.      Cervical back: Normal range of motion.   Skin:     General: Skin is warm and dry.   Neurological:      General: No focal deficit present.      Mental Status: He is alert.   Psychiatric:         Mood and Affect: Mood normal.         Behavior: Behavior normal.         Thought Content: Thought content normal.         Judgment: Judgment normal.            Procedures     Results:  No results " found for this or any previous visit (from the past 24 hours).     Assessment and Plan     Assessment/Plan:  Diagnoses and all orders for this visit:    1. Essential hypertension (Primary)  Assessment & Plan:  Hypertension is borderline  Continue current treatment regimen.  Regular aerobic exercise.  Ambulatory blood pressure monitoring.  Referral to cardiology  Blood pressure will be reassessedin 3 months.    Orders:  -     sacubitril-valsartan (Entresto) 24-26 MG tablet; Take 1 tablet by mouth 2 (Two) Times a Day. Start 7/20/2024  Dispense: 60 tablet; Refill: 6  -     cloNIDine (CATAPRES) 0.1 MG tablet; Take 1 tablet by mouth 2 (Two) Times a Day.  Dispense: 60 tablet; Refill: 3    2. Morbid obesity with body mass index (BMI) of 40.0 or higher  Assessment & Plan:  Patient's (Body mass index is 43.93 kg/m².) indicates that they are morbidly/severely obese (BMI > 40 or > 35 with obesity - related health condition) with health conditions that include hypertension . Weight is improving with lifestyle modifications. BMI  is above average; BMI management plan is completed. We discussed portion control, increasing exercise, and Information on healthy weight added to patient's after visit summary.       3. Venous ulcer of left leg  -     HYDROcodone-acetaminophen (NORCO)  MG per tablet; Take 1 tablet by mouth Every 6 (Six) Hours As Needed for Moderate Pain.  Dispense: 12 tablet; Refill: 0  -     Acetaminophen Extra Strength 500 MG tablet; Take 1 tablet by mouth Every 6 (Six) Hours As Needed (pain).  Dispense: 120 tablet; Refill: 1    4. Diastolic CHF, chronic  Assessment & Plan:  Congestive heart failure due to hypertension.  Heart failure is stable.    Continue current treatment regimen.  Encouraged daily monitoring of the patient's weight.  Regular aerobic exercise.  Continue current medications.  Refer to Cardiology.  Heart failure will be reassessed at the next regular appointment.        Orders:  -     potassium  chloride (MICRO-K) 10 MEQ CR capsule; Take 2 capsules by mouth 2 (Two) Times a Day.  Dispense: 360 capsule; Refill: 0  -     cloNIDine (CATAPRES) 0.1 MG tablet; Take 1 tablet by mouth 2 (Two) Times a Day.  Dispense: 60 tablet; Refill: 3    5. Candidiasis, intertrigo  -     nystatin (MYCOSTATIN) 028032 UNIT/GM cream; Apply 1 Application topically to the appropriate area as directed 2 (Two) Times a Day for 14 days.  Dispense: 30 g; Refill: 0    6. Left hip pain  -     XR Spine Lumbar 2 or 3 View; Future  -     XR Hip With or Without Pelvis 2 - 3 View Left; Future    7. Umbilical hernia without obstruction and without gangrene  -     CT Abdomen Pelvis Without Contrast; Future    Other orders  -     bumetanide (BUMEX) 2 MG tablet; Take 1 tablet by mouth Daily.  Dispense: 60 tablet; Refill: 6        Follow Up  Return in about 3 months (around 8/15/2025) for Recheck.    Andressa Hayward DO   [facility]

## 2025-05-15 NOTE — ASSESSMENT & PLAN NOTE
Congestive heart failure due to hypertension.  Heart failure is stable.    Continue current treatment regimen.  Encouraged daily monitoring of the patient's weight.  Regular aerobic exercise.  Continue current medications.  Refer to Cardiology.  Heart failure will be reassessed at the next regular appointment.

## 2025-05-15 NOTE — ASSESSMENT & PLAN NOTE
Hypertension is borderline  Continue current treatment regimen.  Regular aerobic exercise.  Ambulatory blood pressure monitoring.  Referral to cardiology  Blood pressure will be reassessedin 3 months.

## 2025-05-15 NOTE — ASSESSMENT & PLAN NOTE
Patient's (Body mass index is 43.93 kg/m².) indicates that they are morbidly/severely obese (BMI > 40 or > 35 with obesity - related health condition) with health conditions that include hypertension . Weight is improving with lifestyle modifications. BMI  is above average; BMI management plan is completed. We discussed portion control, increasing exercise, and Information on healthy weight added to patient's after visit summary.

## 2025-06-02 ENCOUNTER — TELEPHONE (OUTPATIENT)
Dept: GASTROENTEROLOGY | Facility: CLINIC | Age: 60
End: 2025-06-02

## 2025-07-10 ENCOUNTER — OFFICE VISIT (OUTPATIENT)
Dept: CARDIOLOGY | Facility: CLINIC | Age: 60
End: 2025-07-10
Payer: MEDICAID

## 2025-07-10 VITALS
BODY MASS INDEX: 42.48 KG/M2 | SYSTOLIC BLOOD PRESSURE: 128 MMHG | HEIGHT: 72 IN | OXYGEN SATURATION: 95 % | WEIGHT: 313.6 LBS | HEART RATE: 78 BPM | DIASTOLIC BLOOD PRESSURE: 62 MMHG

## 2025-07-10 DIAGNOSIS — I10 ESSENTIAL HYPERTENSION: ICD-10-CM

## 2025-07-10 DIAGNOSIS — I50.32 DIASTOLIC CHF, CHRONIC: Primary | ICD-10-CM

## 2025-07-10 RX ORDER — FUROSEMIDE 80 MG/1
TABLET ORAL
COMMUNITY
Start: 2025-07-03

## 2025-07-10 RX ORDER — PREDNISONE 5 MG/1
TABLET ORAL
COMMUNITY
Start: 2025-05-23

## 2025-07-10 NOTE — PROGRESS NOTES
[facility]   OFFICE NOTE      Freeman Jean  1965  PCP: Andressa Hayward DO    SUBJECTIVE:   Freeman Jean is a 59 y.o. male seen for a follow up visit regarding the following:     CC:CHF    HPI:   Pleasant 59-year-old gentleman presents today for diastolic heart failure, edema.  He has been follow with  for venous insufficiency and wound care his legs are now healed well.  He was last seen by our office he had an echocardiogram and EF 70% with HFpEF signs of diastolic dysfunction.  He switched from Bumex to Lasix therapy.  Lower extremity seems improved also had lost some weight.  He still feels short of breath exertion.  He tried to exercise on regular basis.  He tells me is not have any chest pain or chest tightness.    Cardiac PMH: (Old records have been reviewed and summarized below)  Congestive heart failure  Echocardiogram EF 70%, Moderate concentric Hypertrophy, LA normal size. 7/17/2024  Hypertension  Rheumatoid arthritis, followed by rheumatologist  Chronic venous insufficiency  Super morbid obesity, BMI is over 47 (patient was 278 pounds spring 2024  is now currently 344 pounds)  Lower extremity blisters, bullae  Iron deficiency anemia    Past Medical History, Past Surgical History, Family history, Social History, and Medications were all reviewed with the patient today and updated as necessary.       Current Outpatient Medications:     Acetaminophen Extra Strength 500 MG tablet, Take 1 tablet by mouth Every 6 (Six) Hours As Needed (pain)., Disp: 120 tablet, Rfl: 1    ammonium lactate (AMLACTIN) 12 % cream, Apply 1 Application topically to the appropriate area as directed As Needed., Disp: , Rfl:     cloNIDine (CATAPRES) 0.1 MG tablet, Take 1 tablet by mouth 2 (Two) Times a Day., Disp: 60 tablet, Rfl: 3    folic acid (FOLVITE) 1 MG tablet, Take 1 tablet by mouth Daily., Disp: 90 tablet, Rfl: 3    furosemide (LASIX) 80 MG tablet, , Disp: , Rfl:  "    hydrALAZINE (APRESOLINE) 50 MG tablet, Take 1 tablet by mouth 3 (Three) Times a Day., Disp: 90 tablet, Rfl: 5    methotrexate 2.5 MG tablet, Take 8 tablets by mouth., Disp: , Rfl:     METHOTREXATE PO, Take  by mouth., Disp: , Rfl:     mupirocin (BACTROBAN) 2 % ointment, Apply 1 g topically daily to wound for 14 days or until healed (Patient taking differently: Apply 1 Application topically to the appropriate area as directed As Needed. Apply 1 g topically daily to wound for 14 days or until healed), Disp: 14 g, Rfl: 1    olopatadine (PATADAY) 0.2 % solution ophthalmic solution, Administer 1 drop to both eyes Daily., Disp: 2.5 mL, Rfl: 0    potassium chloride (MICRO-K) 10 MEQ CR capsule, Take 2 capsules by mouth 2 (Two) Times a Day., Disp: 360 capsule, Rfl: 0    predniSONE (DELTASONE) 5 MG tablet, , Disp: , Rfl:     sacubitril-valsartan (Entresto) 24-26 MG tablet, Take 1 tablet by mouth 2 (Two) Times a Day. Start 7/20/2024, Disp: 60 tablet, Rfl: 6    Tocilizumab (ACTEMRA SC), Inject  under the skin into the appropriate area as directed., Disp: , Rfl:     empagliflozin (Jardiance) 10 MG tablet tablet, Take 1 tablet by mouth Daily., Disp: 30 tablet, Rfl: 6      No Known Allergies      PHYSICAL EXAM:    /62 (BP Location: Right arm, Patient Position: Sitting, Cuff Size: Large Adult)   Pulse 78   Ht 182.9 cm (72\")   Wt (!) 142 kg (313 lb 9.6 oz)   SpO2 95%   BMI 42.53 kg/m²        Wt Readings from Last 5 Encounters:   07/10/25 (!) 142 kg (313 lb 9.6 oz)   05/15/25 (!) 147 kg (324 lb)   12/17/24 (!) 157 kg (346 lb)   11/24/24 (!) 154 kg (340 lb)   10/15/24 (!) 155 kg (341 lb)       BP Readings from Last 5 Encounters:   07/10/25 128/62   05/15/25 142/84   12/17/24 118/82   11/24/24 136/75   10/15/24 135/97       General appearance - Alert, well appearing, and in no distress   Mental status - Affect appropriate to mood.  Eyes - Sclerae anicteric,  ENMT - Hearing grossly normal bilaterally, Dental hygiene " good.  Neck - Carotids upstroke normal bilaterally, no bruits, no JVD.  Resp - Clear to auscultation, no wheezes, rales or rhonchi, symmetric air entry.  Heart - Normal rate, regular rhythm, normal S1, S2, no murmurs, rubs, clicks or gallops.  GI - Soft, nontender, nondistended, no masses or organomegaly.  Neurological - Grossly intact - normal speech, no focal findings  .  Extremities -3+ lower extremity edema  Skin - Normal coloration and turgor.  Psych -  oriented to person, place, and time.    Medical problems and test results were reviewed with the patient today.             ASSESSMENT   1.  HFpEF, echocardiogram EF 70% LVH grade 1 repaired relaxation no significant valve disease    2.  Lower extremity edema, lower extremity wounds now healed    3. Super morbid obesity, 346lbs. 20 pound weight loss currently 326lbs was last seen by our office.    4. HTN: Controlled, Entresto, hydralazine, clonidine and Lasix    PLAN  Will add Jardiance 10 mg daily to current regiment.  Continue diuretics.  Continue good blood pressure control.  Would like to wean off clonidine at some point.  Will pursue a cardiac MRI, follow-up with Dr. Berry to establish care regarding chronic management of congestive heart failure.      7/10/2025  16:18 EDT  Electronically signed by DENNISE Wong, 12/17/24, 3:49 PM EST.

## 2025-07-31 RX ORDER — FUROSEMIDE 80 MG/1
80 TABLET ORAL 2 TIMES DAILY
Qty: 60 TABLET | OUTPATIENT
Start: 2025-07-31